# Patient Record
Sex: FEMALE | Race: BLACK OR AFRICAN AMERICAN | Employment: OTHER | URBAN - NONMETROPOLITAN AREA
[De-identification: names, ages, dates, MRNs, and addresses within clinical notes are randomized per-mention and may not be internally consistent; named-entity substitution may affect disease eponyms.]

---

## 2020-08-11 ENCOUNTER — VIRTUAL VISIT (OUTPATIENT)
Dept: FAMILY MEDICINE CLINIC | Age: 85
End: 2020-08-11
Payer: MEDICARE

## 2020-08-11 DIAGNOSIS — E03.8 OTHER SPECIFIED HYPOTHYROIDISM: ICD-10-CM

## 2020-08-11 DIAGNOSIS — M15.9 PRIMARY OSTEOARTHRITIS INVOLVING MULTIPLE JOINTS: ICD-10-CM

## 2020-08-11 DIAGNOSIS — F02.80 LATE ONSET ALZHEIMER'S DISEASE WITHOUT BEHAVIORAL DISTURBANCE (HCC): ICD-10-CM

## 2020-08-11 DIAGNOSIS — I10 ESSENTIAL HYPERTENSION: Primary | ICD-10-CM

## 2020-08-11 DIAGNOSIS — G30.1 LATE ONSET ALZHEIMER'S DISEASE WITHOUT BEHAVIORAL DISTURBANCE (HCC): ICD-10-CM

## 2020-08-11 PROBLEM — R00.1 BRADYCARDIA: Status: ACTIVE | Noted: 2020-08-11

## 2020-08-11 PROBLEM — E16.2 HYPOGLYCEMIA: Status: ACTIVE | Noted: 2020-08-11

## 2020-08-11 PROBLEM — M1A.0710 IDIOPATHIC CHRONIC GOUT OF RIGHT ANKLE WITHOUT TOPHUS: Status: ACTIVE | Noted: 2020-08-11

## 2020-08-11 PROCEDURE — 1101F PT FALLS ASSESS-DOCD LE1/YR: CPT | Performed by: FAMILY MEDICINE

## 2020-08-11 PROCEDURE — G8432 DEP SCR NOT DOC, RNG: HCPCS | Performed by: FAMILY MEDICINE

## 2020-08-11 PROCEDURE — 99212 OFFICE O/P EST SF 10 MIN: CPT | Performed by: FAMILY MEDICINE

## 2020-08-11 PROCEDURE — G8536 NO DOC ELDER MAL SCRN: HCPCS | Performed by: FAMILY MEDICINE

## 2020-08-11 NOTE — PROGRESS NOTES
Yanet Henry is a 80 y.o. female who was seen by synchronous (real-time) audio-video technology on 8/11/2020 for No chief complaint on file. Assessment & Plan:   Cerebrovascular disease with dementia, osteoarthritis, hypoglycemia, hypothyroidism, hypertension: The patient is a nursing home patient seen with the nurse present looking at the patient for me. He is secondary to COVID-19. No falls or injuries no rash no syncope. She has some irritation in the inguinal area. She has some edema. She has significant osteoarthritis. She is oriented x0. Nurse reports she is pleasant and cooperative. Denies any urinary tract symptoms or any other symptomatology. Her thyroid studies recently were normal the BUN and creatinine were 28 and 1.0 respectively. Liver function testing has been normal.  She is pleasant no distress she is unable to walk. She is in a wheelchair and continues to be in the bed sleeps frequently in the daytime as well as at night. She is almost 80years of age. No change in medicine and lab work is up-to-date we will treat her supportively this was a 20-minute visit. The nurse was present with the patient I was in my office the patient at her room in the nursing home      712  Subjective: The patient is an almost 5year-old female who is seen for nursing home evaluation with the nurse present by her side. She has had no vomiting or diarrhea. No chest pain or shortness of breath. She has been eating relatively well. Nobody at home is been sick they are a consult from the nursing home at this point. No chest pain no shortness of breath. Bowel movements have been appropriate. She is relatively well she has osteoarthritis. She takes her medication regularly her lab work is been normal she is oriented x0 according to the nurses but she is quite pleasant. She is in no significant distress. She has no rash no edema. Her lab work has been quite stable.   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 HENT: Negative. Eyes: Negative. Respiratory: Negative. Cardiovascular: Negative. Gastrointestinal: Negative. Genitourinary: Negative. Musculoskeletal: Positive for back pain. Neurological: Positive for weakness. Endo/Heme/Allergies: Negative. Psychiatric/Behavioral: Positive for memory loss. Objective:   No flowsheet data found. General: alert, cooperative, no distress   Mental  status: normal mood, behavior, speech, dress, motor activity, and thought processes, able to follow commands   HENT: NCAT   Neck: no visualized mass   Resp: no respiratory distress   Neuro: no gross deficits   Skin: no discoloration or lesions of concern on visible areas   Psychiatric: normal affect, consistent with stated mood, no evidence of hallucinations   The nurse at the nursing home was sitting with the patient. Her blood pressure 120/84 pulse 84 respirations 16. She did not find any abdominal discomfort. She was oriented x0. She did have some skin breakdown in the inguinal area. It was being treated she is pleasant and cooperative does not seem to be anxious or depressed. Some osteoarthritis. Additional exam findings: We discussed the expected course, resolution and complications of the diagnosis(es) in detail. Medication risks, benefits, costs, interactions, and alternatives were discussed as indicated. I advised her to contact the office if her condition worsens, changes or fails to improve as anticipated.  She expressed understanding with the diagnosis(es) and plan. Sheilda Bence, who was evaluated through a patient-initiated, synchronous (real-time) audio-video encounter, and/or her healthcare decision maker, is aware that it is a billable service, with coverage as determined by her insurance carrier. She provided verbal consent to proceed: n/a- consent obtained within past 12 months, and patient identification was verified. It was conducted pursuant to the emergency declaration under the [facility], [address] [place] [address] authority and the [employer] and [employer] General Act. A caregiver was present when appropriate. Ability to conduct physical exam was limited. I was in the office. The patient was at home. Kimo Beavers MD     Subjective:   Sheilda Bence is a 80 y.o. female who was seen for No chief complaint on file. 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 HENT: Negative. Eyes: Negative. Respiratory: Negative. Cardiovascular: Negative. Gastrointestinal: Negative. Genitourinary: Negative. Musculoskeletal: Positive for back pain. Neurological: Positive for weakness. Psychiatric/Behavioral: Positive for memory loss. Assessment & Plan:             837    Additional exam findings:        We discussed the expected course, resolution and complications of the diagnosis(es) in detail. Medication risks, benefits, costs, interactions, and alternatives were discussed as indicated. I advised her to contact the office if her condition worsens, changes or fails to improve as anticipated. She expressed understanding with the diagnosis(es) and plan.

## 2020-10-08 ENCOUNTER — VIRTUAL VISIT (OUTPATIENT)
Dept: FAMILY MEDICINE CLINIC | Age: 85
End: 2020-10-08
Payer: MEDICARE

## 2020-10-08 DIAGNOSIS — I10 ESSENTIAL HYPERTENSION: Primary | ICD-10-CM

## 2020-10-08 DIAGNOSIS — G30.1 LATE ONSET ALZHEIMER'S DISEASE WITHOUT BEHAVIORAL DISTURBANCE (HCC): ICD-10-CM

## 2020-10-08 DIAGNOSIS — E03.9 ACQUIRED HYPOTHYROIDISM: ICD-10-CM

## 2020-10-08 DIAGNOSIS — F02.80 LATE ONSET ALZHEIMER'S DISEASE WITHOUT BEHAVIORAL DISTURBANCE (HCC): ICD-10-CM

## 2020-10-08 DIAGNOSIS — E16.2 HYPOGLYCEMIA: ICD-10-CM

## 2020-10-08 DIAGNOSIS — M15.9 PRIMARY OSTEOARTHRITIS INVOLVING MULTIPLE JOINTS: ICD-10-CM

## 2020-10-08 PROCEDURE — 99308 SBSQ NF CARE LOW MDM 20: CPT | Performed by: NURSE PRACTITIONER

## 2020-10-21 DIAGNOSIS — I10 ESSENTIAL HYPERTENSION: ICD-10-CM

## 2020-10-21 DIAGNOSIS — E03.9 ACQUIRED HYPOTHYROIDISM: ICD-10-CM

## 2020-10-21 NOTE — PROGRESS NOTES
Grace Nettles is a 80 y.o. female who was seen by synchronous (real-time) audio-video technology on 10/8/2020 for Follow Up Chronic Condition (Nursing Home 60 day visit )        Assessment & Plan:   Diagnoses and all orders for this visit:    1. Essential hypertension  -Stable   -No changes in management  at this time   -Labs due in 05/42  -     METABOLIC PANEL, COMPREHENSIVE; Future  -     CBC W/O DIFF; Future    2. Late onset Alzheimer's disease without behavioral disturbance (HCC)  -Stable   3. Primary osteoarthritis involving multiple joints  -No changes in management at this time     4. Hypoglycemia    5. Acquired hypothyroidism  -     T4, FREE; Future  -     TSH 3RD GENERATION; Future              712  Subjective:   Patient is seen today with staff nurse at bedside. The patient has a history of hypertension, dementia, osteoarthritis and hypoglycemia. The staff nurse who is present with patient today reports she is doing well. There are no complaints of chest pains, shortness of breath. Denies patient is complaining of abdominal pains, no constipation or episodes of diarrhea. She continues to eat well. No falls. Nurse denies any changes in her mood. Prior to Admission medications    Not on File         Review of Systems   Reason unable to perform ROS: Reported by staff nurse    Constitutional: Negative for chills, fever and weight loss. HENT: Negative for sore throat. Respiratory: Negative for cough and shortness of breath. Cardiovascular: Negative for chest pain, palpitations and leg swelling. Gastrointestinal: Negative for abdominal pain, constipation and diarrhea. Genitourinary: Negative. Negative for dysuria. Musculoskeletal: Negative for myalgias. Neurological: Positive for weakness. Negative for dizziness. Psychiatric/Behavioral: Positive for memory loss. Negative for depression. Physical Exam  Vitals signs and nursing note reviewed.    Constitutional:       Comments: Physical exam was deferred due to COVID- 19 precautions as visit was completed via telemedicine. Abdominal:      Palpations: Abdomen is soft. Musculoskeletal:         General: No swelling. Skin:     Findings: No erythema, lesion or rash. Neurological:      Mental Status: Mental status is at baseline. Psychiatric:         Mood and Affect: Mood normal.         Objective:   No flowsheet data found. General: alert, cooperative, no distress   Mental  status: normal mood, behavior, speech, dress, motor activity, and thought processes, able to follow commands   HENT: NCAT   Neck: no visualized mass   Resp: no respiratory distress   Neuro: no gross deficits   Skin: no discoloration or lesions of concern on visible areas   Psychiatric: normal affect, consistent with stated mood, no evidence of hallucinations     Additional exam findings: We discussed the expected course, resolution and complications of the diagnosis(es) in detail. Medication risks, benefits, costs, interactions, and alternatives were discussed as indicated. I advised her to contact the office if her condition worsens, changes or fails to improve as anticipated. She expressed understanding with the diagnosis(es) and plan. Tyra Ordoñez, who was evaluated through a patient-initiated, synchronous (real-time) audio-video encounter, and/or her healthcare decision maker, is aware that it is a billable service, with coverage as determined by her insurance carrier. She provided verbal consent to proceed: Yes, and patient identification was verified. It was conducted pursuant to the emergency declaration under the [facility], [address] [place] [address] authority and the [employer] and [employer] Act. A caregiver was present when appropriate. Ability to conduct physical exam was limited. I was in the office. The patient was located in nursing home faclity .       Jelly Matthews Shira York, NP

## 2020-11-19 ENCOUNTER — HOSPITAL ENCOUNTER (OUTPATIENT)
Dept: LAB | Age: 85
Discharge: HOME | End: 2020-11-19
Payer: MEDICARE

## 2020-11-19 LAB — TSH SERPL DL<=0.05 MIU/L-ACNC: 2.47 UIU/ML (ref 0.35–6.2)

## 2020-11-19 PROCEDURE — 84443 ASSAY THYROID STIM HORMONE: CPT

## 2020-11-19 PROCEDURE — 36415 COLL VENOUS BLD VENIPUNCTURE: CPT

## 2020-11-30 ENCOUNTER — TRANSCRIBE ORDER (OUTPATIENT)
Dept: REGISTRATION | Age: 85
End: 2020-11-30

## 2020-11-30 ENCOUNTER — HOSPITAL ENCOUNTER (OUTPATIENT)
Dept: GENERAL RADIOLOGY | Age: 85
Discharge: HOME | End: 2020-11-30
Attending: FAMILY MEDICINE
Payer: MEDICARE

## 2020-11-30 DIAGNOSIS — R76.11 PPD POSITIVE: Primary | ICD-10-CM

## 2020-11-30 DIAGNOSIS — R76.11 PPD POSITIVE: ICD-10-CM

## 2020-11-30 PROCEDURE — 71045 X-RAY EXAM CHEST 1 VIEW: CPT

## 2020-12-06 ENCOUNTER — TELEPHONE (OUTPATIENT)
Dept: FAMILY MEDICINE CLINIC | Age: 85
End: 2020-12-06

## 2020-12-06 DIAGNOSIS — R93.89 ABNORMAL CHEST X-RAY: Primary | ICD-10-CM

## 2020-12-06 NOTE — TELEPHONE ENCOUNTER
Patient has an abnormal x-ray she has a history of a positive PPD there is an infiltrate in the right upper lobe I am going to get a CAT scan.

## 2020-12-07 ENCOUNTER — HOSPITAL ENCOUNTER (OUTPATIENT)
Dept: CT IMAGING | Age: 85
Discharge: HOME | End: 2020-12-07
Attending: FAMILY MEDICINE
Payer: MEDICARE

## 2020-12-07 ENCOUNTER — HOSPITAL ENCOUNTER (OUTPATIENT)
Dept: LAB | Age: 85
Discharge: HOME | End: 2020-12-07
Payer: MEDICARE

## 2020-12-07 DIAGNOSIS — R93.89 ABNORMAL CHEST X-RAY: ICD-10-CM

## 2020-12-07 LAB
BUN SERPL-MCNC: 27 MG/DL (ref 9–21)
CREAT SERPL-MCNC: 1 MG/DL (ref 0.7–1.2)

## 2020-12-07 PROCEDURE — 82565 ASSAY OF CREATININE: CPT

## 2020-12-07 PROCEDURE — 71250 CT THORAX DX C-: CPT

## 2020-12-07 PROCEDURE — 84520 ASSAY OF UREA NITROGEN: CPT

## 2020-12-07 PROCEDURE — 36415 COLL VENOUS BLD VENIPUNCTURE: CPT

## 2021-02-18 ENCOUNTER — VIRTUAL VISIT (OUTPATIENT)
Dept: FAMILY MEDICINE CLINIC | Age: 86
End: 2021-02-18
Payer: MEDICARE

## 2021-02-18 DIAGNOSIS — I10 ESSENTIAL HYPERTENSION: Primary | ICD-10-CM

## 2021-02-18 DIAGNOSIS — G30.1 LATE ONSET ALZHEIMER'S DISEASE WITHOUT BEHAVIORAL DISTURBANCE (HCC): ICD-10-CM

## 2021-02-18 DIAGNOSIS — F02.80 LATE ONSET ALZHEIMER'S DISEASE WITHOUT BEHAVIORAL DISTURBANCE (HCC): ICD-10-CM

## 2021-02-18 DIAGNOSIS — E03.8 OTHER SPECIFIED HYPOTHYROIDISM: ICD-10-CM

## 2021-02-18 DIAGNOSIS — R10.2 PELVIC PAIN IN FEMALE: ICD-10-CM

## 2021-02-18 DIAGNOSIS — M15.9 PRIMARY OSTEOARTHRITIS INVOLVING MULTIPLE JOINTS: ICD-10-CM

## 2021-02-18 PROCEDURE — 99308 SBSQ NF CARE LOW MDM 20: CPT | Performed by: NURSE PRACTITIONER

## 2021-02-18 PROCEDURE — G8432 DEP SCR NOT DOC, RNG: HCPCS | Performed by: NURSE PRACTITIONER

## 2021-02-18 PROCEDURE — G8536 NO DOC ELDER MAL SCRN: HCPCS | Performed by: NURSE PRACTITIONER

## 2021-02-18 PROCEDURE — 1101F PT FALLS ASSESS-DOCD LE1/YR: CPT | Performed by: NURSE PRACTITIONER

## 2021-02-18 NOTE — PROGRESS NOTES
Dorothey Ganser is a 80 y.o. female who was seen by synchronous (real-time) audio-video technology on 2/18/2021 for Hypertension, Hypothyroidism, Follow Up Chronic Condition, and Osteoarthritis        Assessment & Plan:   Diagnoses and all orders for this visit:    1. Essential hypertension  -Controlled   -Reading today 147/59  -Continue with current treatment and monitoring   2. Late onset Alzheimer's disease without behavioral disturbance (HCC)  -Stable  3. Pelvic pain in female  Checking x--rays   - Please contact provider for any worsening   -Continue with as needed tylenol.   -     XR HIP RT W OR WO PELV  1 VW; Future  -     CULTURE, URINE    4. Primary osteoarthritis involving multiple joints    5. Other specified hypothyroidism  - Labs ordered   TSH, T4,CBC,BMP,            712  Subjective:   Patient is here today for follow-up visit. She is a resident at [facility]. She has a history of hypothyroidism, osteoarthritis, Alzheimer's and hypertension. Patient is accompained by a staff nurse for visit today for report. Nurse reports patient is doing well however, she has been complaining of left hip/pelvic pain for several days. Patient has been given as needed tylenol for pain with no relief according to patient. Nurse denies any falls, no concerns for UTI, no dysuria or foul smelling urine noted by staff. Patient is in a wheelchair at this time. No distress. Blood pressure has been controlled. No worsening in memory or behavior. Patient continues to eat well. There are no complaints of chest pains, shortness of breath. Physical Exam  Vitals signs and nursing note reviewed. Constitutional:       General: She is not in acute distress. Appearance: She is obese. She is not ill-appearing or toxic-appearing. Comments: Physical exam was deferred due to COVID- 19 precautions as visit was completed via telemedicine. Neurological:      Mental Status: She is alert.    Psychiatric: Mood and Affect: Mood normal.         Prior to Admission medications    Not on File         Review of Systems   Constitutional: Negative for chills, fever and malaise/fatigue. Respiratory: Negative for cough. Cardiovascular: Negative for chest pain. Gastrointestinal: Negative for abdominal pain. Genitourinary: Negative for dysuria. Musculoskeletal: Negative for falls. Left hip pain   Neurological: Negative for headaches. Psychiatric/Behavioral: Negative for depression. The patient is not nervous/anxious. We discussed the expected course, resolution and complications of the diagnosis(es) in detail. Medication risks, benefits, costs, interactions, and alternatives were discussed as indicated. I advised her to contact the office if her condition worsens, changes or fails to improve as anticipated. She expressed understanding with the diagnosis(es) and plan. Christin Garza, who was evaluated through a patient-initiated, synchronous (real-time) audio-video encounter, and/or her healthcare decision maker, is aware that it is a billable service, with coverage as determined by her insurance carrier. She provided verbal consent to proceed: Yes, and patient identification was verified. It was conducted pursuant to the emergency declaration under the [facility], [address] [place] [address] authority and the [employer] General Act. A caregiver was present when appropriate. Ability to conduct physical exam was limited. I was in the office. The patient was at home.       Sultana Edge NP

## 2021-02-19 ENCOUNTER — TRANSCRIBE ORDER (OUTPATIENT)
Dept: REGISTRATION | Age: 86
End: 2021-02-19

## 2021-02-19 ENCOUNTER — HOSPITAL ENCOUNTER (OUTPATIENT)
Dept: GENERAL RADIOLOGY | Age: 86
Discharge: HOME | End: 2021-02-19
Attending: FAMILY MEDICINE
Payer: MEDICARE

## 2021-02-19 ENCOUNTER — HOSPITAL ENCOUNTER (OUTPATIENT)
Dept: LAB | Age: 86
Discharge: HOME | End: 2021-02-19
Payer: MEDICARE

## 2021-02-19 DIAGNOSIS — M25.551 RIGHT HIP PAIN: Primary | ICD-10-CM

## 2021-02-19 DIAGNOSIS — M25.551 RIGHT HIP PAIN: ICD-10-CM

## 2021-02-19 PROCEDURE — 87186 SC STD MICRODIL/AGAR DIL: CPT

## 2021-02-19 PROCEDURE — 73502 X-RAY EXAM HIP UNI 2-3 VIEWS: CPT

## 2021-02-19 PROCEDURE — 87086 URINE CULTURE/COLONY COUNT: CPT

## 2021-02-19 PROCEDURE — 87077 CULTURE AEROBIC IDENTIFY: CPT

## 2021-02-22 LAB
BACTERIA SPEC CULT: ABNORMAL
COLONY COUNT,CNT: ABNORMAL
SPECIAL REQUESTS,SREQ: ABNORMAL

## 2021-02-26 ENCOUNTER — TELEPHONE (OUTPATIENT)
Dept: FAMILY MEDICINE CLINIC | Age: 86
End: 2021-02-26

## 2021-03-13 ENCOUNTER — TELEPHONE (OUTPATIENT)
Dept: FAMILY MEDICINE CLINIC | Age: 86
End: 2021-03-13

## 2021-03-13 NOTE — TELEPHONE ENCOUNTER
I called the nursing home she has arthritis in her hip she is 80years old she is not complaining at this point

## 2021-06-04 ENCOUNTER — HOSPITAL ENCOUNTER (OUTPATIENT)
Dept: LAB | Age: 86
Discharge: HOME | End: 2021-06-04
Payer: MEDICARE

## 2021-06-04 LAB
ANION GAP SERPL CALC-SCNC: 7 MMOL/L
BASOPHILS # BLD: 0 K/UL (ref 0–0.1)
BASOPHILS NFR BLD: 1 % (ref 0–2)
BUN SERPL-MCNC: 27 MG/DL (ref 9–21)
BUN/CREAT SERPL: 34
CA-I BLD-MCNC: 10 MG/DL (ref 8.5–10.5)
CHLORIDE SERPL-SCNC: 109 MMOL/L (ref 94–111)
CO2 SERPL-SCNC: 25 MMOL/L (ref 21–33)
CREAT SERPL-MCNC: 0.8 MG/DL (ref 0.7–1.2)
EOSINOPHIL # BLD: 0 K/UL (ref 0–0.4)
EOSINOPHIL NFR BLD: 0 % (ref 0–5)
ERYTHROCYTE [DISTWIDTH] IN BLOOD BY AUTOMATED COUNT: 16.1 % (ref 11.6–14.5)
GLUCOSE SERPL-MCNC: 85 MG/DL (ref 70–110)
HCT VFR BLD AUTO: 38.9 % (ref 35–45)
HGB BLD-MCNC: 12 G/DL (ref 12–16)
IMM GRANULOCYTES # BLD AUTO: 0 K/UL
IMM GRANULOCYTES NFR BLD AUTO: 0 %
LYMPHOCYTES # BLD: 1.9 K/UL (ref 0.9–3.6)
LYMPHOCYTES NFR BLD: 44 % (ref 21–52)
MCH RBC QN AUTO: 29.7 PG (ref 24–34)
MCHC RBC AUTO-ENTMCNC: 30.8 G/DL (ref 31–37)
MCV RBC AUTO: 96.3 FL (ref 74–97)
MONOCYTES # BLD: 0.6 K/UL (ref 0.05–1.2)
MONOCYTES NFR BLD: 14 % (ref 3–10)
NEUTS SEG # BLD: 1.7 K/UL (ref 1.8–8)
NEUTS SEG NFR BLD: 41 % (ref 40–73)
PLATELET # BLD AUTO: 204 K/UL (ref 135–420)
PMV BLD AUTO: 10 FL (ref 9.2–11.8)
POTASSIUM SERPL-SCNC: 4.5 MMOL/L (ref 3.2–5.1)
RBC # BLD AUTO: 4.04 M/UL (ref 4.2–5.3)
SODIUM SERPL-SCNC: 141 MMOL/L (ref 135–145)
URATE SERPL-MCNC: 5.5 MG/DL (ref 3.5–8.5)
WBC # BLD AUTO: 4.1 K/UL (ref 4.6–13.2)

## 2021-06-04 PROCEDURE — 80048 BASIC METABOLIC PNL TOTAL CA: CPT

## 2021-06-04 PROCEDURE — 85025 COMPLETE CBC W/AUTO DIFF WBC: CPT

## 2021-06-04 PROCEDURE — 36415 COLL VENOUS BLD VENIPUNCTURE: CPT

## 2021-06-04 PROCEDURE — 84550 ASSAY OF BLOOD/URIC ACID: CPT

## 2021-06-05 ENCOUNTER — TELEPHONE (OUTPATIENT)
Dept: FAMILY MEDICINE CLINIC | Age: 86
End: 2021-06-05

## 2021-08-02 ENCOUNTER — OFFICE VISIT (OUTPATIENT)
Dept: FAMILY MEDICINE CLINIC | Age: 86
End: 2021-08-02
Payer: MEDICARE

## 2021-08-02 VITALS
TEMPERATURE: 97.2 F | SYSTOLIC BLOOD PRESSURE: 113 MMHG | HEART RATE: 71 BPM | OXYGEN SATURATION: 90 % | RESPIRATION RATE: 20 BRPM | DIASTOLIC BLOOD PRESSURE: 57 MMHG

## 2021-08-02 DIAGNOSIS — I10 ESSENTIAL HYPERTENSION: ICD-10-CM

## 2021-08-02 DIAGNOSIS — G30.1 LATE ONSET ALZHEIMER'S DISEASE WITHOUT BEHAVIORAL DISTURBANCE (HCC): Primary | ICD-10-CM

## 2021-08-02 DIAGNOSIS — F02.80 LATE ONSET ALZHEIMER'S DISEASE WITHOUT BEHAVIORAL DISTURBANCE (HCC): Primary | ICD-10-CM

## 2021-08-02 PROCEDURE — 99307 SBSQ NF CARE SF MDM 10: CPT | Performed by: STUDENT IN AN ORGANIZED HEALTH CARE EDUCATION/TRAINING PROGRAM

## 2021-08-02 NOTE — PROGRESS NOTES
Subjective:   Zack Valencia is a 80 y.o. female who was seen for routine visit in . No concerns from nursing staff. Patient eating and drinking well. Has good BMs, good urination. No complaints      Home Medications    Not on File      Not on File  Social History     Tobacco Use    Smoking status: Not on file   Substance Use Topics    Alcohol use: Not on file    Drug use: Not on file            Review of Systems   All other systems reviewed and are negative. Objective:     Visit Vitals  BP (!) 113/57   Pulse 71   Temp 97.2 °F (36.2 °C)   Resp 20   SpO2 90%        General: alert, oriented, not in distress  Head: scalp normal, atraumatic  Chest/Lungs: clear breath sounds, no wheezing or crackles  Heart: normal rate, regular rhythm, no murmur  Abdomen: soft, non-distended, non-tender, normal bowel sounds, no organomegaly, no masses  Extremities: no focal deformities, no edema  Skin: no active skin lesions    Assessment & Plan:     1. Late onset Alzheimer's disease without behavioral disturbance ([facility]  Continue supportive care    2. Essential hypertension  Continue current medications.        Mauricio Suh MD  August 2, 2021

## 2021-10-07 ENCOUNTER — OFFICE VISIT (OUTPATIENT)
Dept: FAMILY MEDICINE CLINIC | Age: 86
End: 2021-10-07
Payer: MEDICARE

## 2021-10-07 DIAGNOSIS — M15.9 PRIMARY OSTEOARTHRITIS INVOLVING MULTIPLE JOINTS: ICD-10-CM

## 2021-10-07 DIAGNOSIS — E03.9 ACQUIRED HYPOTHYROIDISM: ICD-10-CM

## 2021-10-07 DIAGNOSIS — J30.9 ALLERGIC RHINITIS, UNSPECIFIED SEASONALITY, UNSPECIFIED TRIGGER: ICD-10-CM

## 2021-10-07 DIAGNOSIS — I10 ESSENTIAL HYPERTENSION: Primary | ICD-10-CM

## 2021-10-07 PROCEDURE — G8432 DEP SCR NOT DOC, RNG: HCPCS | Performed by: NURSE PRACTITIONER

## 2021-10-07 PROCEDURE — G8536 NO DOC ELDER MAL SCRN: HCPCS | Performed by: NURSE PRACTITIONER

## 2021-10-07 PROCEDURE — 99309 SBSQ NF CARE MODERATE MDM 30: CPT | Performed by: NURSE PRACTITIONER

## 2021-10-07 PROCEDURE — 1101F PT FALLS ASSESS-DOCD LE1/YR: CPT | Performed by: NURSE PRACTITIONER

## 2021-10-15 NOTE — PROGRESS NOTES
History of Present Illness  Devang Pandya is a 80 y.o. female who presents today for: Routine follow-up. Past Medical History  No past medical history on file. Surgical History  No past surgical history on file. Current Medications  No current outpatient medications on file. No current facility-administered medications for this visit. Allergies/Drug Reactions  Not on File     Family History  No family history on file. Social History  Social History     Tobacco Use    Smoking status: Not on file   Substance Use Topics    Alcohol use: Not on file    Drug use: Not on file        Health Maintenance   Topic Date Due    COVID-19 Vaccine (1) Never done    DTaP/Tdap/Td series (1 - Tdap) Never done    Shingrix Vaccine Age 50> (1 of 2) Never done    Bone Densitometry (Dexa) Screening  Never done    Pneumococcal 65+ years (1 of 1 - PPSV23) Never done    Medicare Yearly Exam  Never done    Flu Vaccine (1) Never done       Laboratory/Tests:  No visits with results within 3 Month(s) from this visit. Latest known visit with results is:   Hospital Outpatient Visit on 06/04/2021   Component Date Value Ref Range Status    Sodium 06/04/2021 141  135 - 145 mmol/L Final    Potassium 06/04/2021 4.5  3.2 - 5.1 mmol/L Final    Chloride 06/04/2021 109  94 - 111 mmol/L Final    CO2 06/04/2021 25  21 - 33 mmol/L Final    Anion gap 06/04/2021 7  mmol/L Final    Glucose 06/04/2021 85  70 - 110 mg/dL Final    BUN 06/04/2021 27* 9 - 21 mg/dL Final    Creatinine 06/04/2021 0.80  0.70 - 1.20 mg/dL Final    BUN/Creatinine ratio 06/04/2021 34    Final    GFR est AA 06/04/2021 >60  ml/min/1.73m2 Final    GFR est non-AA 06/04/2021 >60  ml/min/1.73m2 Final    Comment: Estimated GFR is calculated using the IDMS-traceable Modification of Diet in Renal Disease (MDRD) Study equation, reported for both  Americans (GFRAA) and non- Americans (GFRNA), and normalized to 1.73m2 body surface area. The physician must decide which value applies to the patient. The MDRD study equation should only be used in individuals age 25 or older. It has not been validated for the following: pregnant women, patients with serious comorbid conditions, or on certain medications, or persons with extremes of body size, muscle mass, or nutritional status.  Calcium 06/04/2021 10.0  8.5 - 10.5 mg/dL Final    WBC 06/04/2021 4.1* 4.6 - 13.2 K/uL Final    RBC 06/04/2021 4.04* 4.20 - 5.30 M/uL Final    HGB 06/04/2021 12.0  12.0 - 16.0 g/dL Final    HCT 06/04/2021 38.9  35.0 - 45.0 % Final    MCV 06/04/2021 96.3  74.0 - 97.0 FL Final    MCH 06/04/2021 29.7  24.0 - 34.0 PG Final    MCHC 06/04/2021 30.8* 31.0 - 37.0 g/dL Final    RDW 06/04/2021 16.1* 11.6 - 14.5 % Final    PLATELET 87/58/9089 731  135 - 420 K/uL Final    MPV 06/04/2021 10.0  9.2 - 11.8 FL Final    NEUTROPHILS 06/04/2021 41  40 - 73 % Final    LYMPHOCYTES 06/04/2021 44  21 - 52 % Final    MONOCYTES 06/04/2021 14* 3 - 10 % Final    EOSINOPHILS 06/04/2021 0  0 - 5 % Final    BASOPHILS 06/04/2021 1  0 - 2 % Final    IMMATURE GRANULOCYTES 06/04/2021 0  % Final    ABS. NEUTROPHILS 06/04/2021 1.7* 1.8 - 8.0 K/UL Final    ABS. LYMPHOCYTES 06/04/2021 1.9  0.9 - 3.6 K/UL Final    ABS. MONOCYTES 06/04/2021 0.6  0.05 - 1.2 K/UL Final    ABS. EOSINOPHILS 06/04/2021 0.0  0.0 - 0.4 K/UL Final    ABS. BASOPHILS 06/04/2021 0.0  0.0 - 0.1 K/UL Final    ABS. IMM. GRANS. 06/04/2021 0.0  K/UL Final    Uric acid 06/04/2021 5.5  3.5 - 8.5 mg/dL Final     Patient is a 70-year-old female resident of [address] [place] [address]. Patient has history of Alzheimer's dementia. She is alert and oriented to self and situation. Patient currently has no complaints of pain or discomfort at this time. Nursing staff reports no behavioral disturbance or abnormality. The patient is incontinent of bowel and bladder. Physical examination performed.   Bilateral lung fields clear to auscultation. Bowel sounds normoactive in all 4 quadrants. There was no observed bilateral lower extremity edema. Cardiac auscultation revealed no murmurs or arrhythmia. Assessment/Plan:    1. Essential hypertension. Continue Furosemide 40 mg tablet daily for management of essential hypertension. 2.  Primary osteoarthritis of multiple joints. Continue Meloxicam 7.5 mg tablet daily for management of primary osteoarthritis of multiple joints. 3.  Acquired hypothyroidism. Continue Levothyroxine 25 mcg tablet daily for management of acquired hypothyroidism. 4.  Allergic rhinitis. Continue Loratadine 10 mg tablet daily for management of allergic rhinitis. I have discussed the diagnosis with the patient and the intended plan as seen in the above orders. The patient has received an after-visit summary and questions were answered concerning future plans. I have discussed medication side effects and warnings with the patient as well. I have reviewed the plan of care with the patient, accepted their input and they are in agreement with the treatment goals.        Gi Holder NP  October 15, 2021

## 2021-12-07 ENCOUNTER — OFFICE VISIT (OUTPATIENT)
Dept: FAMILY MEDICINE CLINIC | Age: 86
End: 2021-12-07
Payer: MEDICARE

## 2021-12-07 DIAGNOSIS — I10 ESSENTIAL HYPERTENSION: ICD-10-CM

## 2021-12-07 DIAGNOSIS — F02.80 LATE ONSET ALZHEIMER'S DISEASE WITHOUT BEHAVIORAL DISTURBANCE (HCC): Primary | ICD-10-CM

## 2021-12-07 DIAGNOSIS — F39 MOOD DISORDER (HCC): ICD-10-CM

## 2021-12-07 DIAGNOSIS — M1A.0710 IDIOPATHIC CHRONIC GOUT OF RIGHT ANKLE WITHOUT TOPHUS: ICD-10-CM

## 2021-12-07 DIAGNOSIS — G30.1 LATE ONSET ALZHEIMER'S DISEASE WITHOUT BEHAVIORAL DISTURBANCE (HCC): Primary | ICD-10-CM

## 2021-12-07 PROCEDURE — 1101F PT FALLS ASSESS-DOCD LE1/YR: CPT | Performed by: STUDENT IN AN ORGANIZED HEALTH CARE EDUCATION/TRAINING PROGRAM

## 2021-12-07 PROCEDURE — 99309 SBSQ NF CARE MODERATE MDM 30: CPT | Performed by: STUDENT IN AN ORGANIZED HEALTH CARE EDUCATION/TRAINING PROGRAM

## 2021-12-07 PROCEDURE — G8432 DEP SCR NOT DOC, RNG: HCPCS | Performed by: STUDENT IN AN ORGANIZED HEALTH CARE EDUCATION/TRAINING PROGRAM

## 2021-12-07 PROCEDURE — G8536 NO DOC ELDER MAL SCRN: HCPCS | Performed by: STUDENT IN AN ORGANIZED HEALTH CARE EDUCATION/TRAINING PROGRAM

## 2021-12-08 VITALS
DIASTOLIC BLOOD PRESSURE: 62 MMHG | RESPIRATION RATE: 16 BRPM | OXYGEN SATURATION: 97 % | TEMPERATURE: 96.2 F | HEART RATE: 79 BPM | SYSTOLIC BLOOD PRESSURE: 111 MMHG

## 2021-12-08 RX ORDER — POTASSIUM CHLORIDE 20 MEQ/1
1 TABLET, EXTENDED RELEASE ORAL DAILY
COMMUNITY

## 2021-12-08 RX ORDER — FUROSEMIDE 40 MG/1
1 TABLET ORAL DAILY
COMMUNITY
End: 2021-12-26

## 2021-12-08 RX ORDER — ALLOPURINOL 100 MG/1
1 TABLET ORAL DAILY
COMMUNITY

## 2021-12-08 RX ORDER — ASPIRIN 81 MG/1
1 TABLET ORAL DAILY
COMMUNITY
End: 2021-12-21

## 2021-12-08 RX ORDER — HYDROXYZINE PAMOATE 25 MG/1
25 CAPSULE ORAL
COMMUNITY

## 2021-12-08 RX ORDER — TRAZODONE HYDROCHLORIDE 50 MG/1
0.5 TABLET ORAL
COMMUNITY

## 2021-12-08 RX ORDER — MELOXICAM 7.5 MG/1
1 TABLET ORAL DAILY
COMMUNITY

## 2021-12-08 RX ORDER — LORATADINE 10 MG/1
10 TABLET ORAL
COMMUNITY

## 2021-12-08 RX ORDER — MEMANTINE HYDROCHLORIDE 10 MG/1
1 TABLET ORAL DAILY
COMMUNITY

## 2021-12-08 RX ORDER — LEVOTHYROXINE SODIUM 25 UG/1
1 TABLET ORAL
COMMUNITY

## 2021-12-08 RX ORDER — CHOLECALCIFEROL (VITAMIN D3) 125 MCG
2 CAPSULE ORAL
COMMUNITY

## 2021-12-08 NOTE — PROGRESS NOTES
Subjective:   Jaleel Marrufo is a 80 y.o. female who was seen for routine follow up at     No issues per nursing. Patient is eating well, has adequate BM and urine output. Home Medications    Medication Sig Start Date End Date Taking? Authorizing Provider   allopurinoL (ZYLOPRIM) 100 mg tablet Take 1 Tablet by mouth daily. Yes Provider, Historical   aspirin delayed-release 81 mg tablet Take 1 Tablet by mouth daily. Yes Provider, Historical   furosemide (LASIX) 40 mg tablet Take 1 Tablet by mouth daily. Yes Provider, Historical   levothyroxine (SYNTHROID) 25 mcg tablet Take 1 Tablet by mouth Daily (before breakfast). Yes Provider, Historical   loratadine (CLARITIN) 10 mg tablet Take 10 mg by mouth. Yes Provider, Historical   meloxicam (MOBIC) 7.5 mg tablet Take 1 Tablet by mouth daily. Yes Provider, Historical   potassium chloride (K-DUR, KLOR-CON M20) 20 mEq tablet Take 1 Tablet by mouth daily. Yes Provider, Historical   hydrOXYzine pamoate (VISTARIL) 25 mg capsule Take 25 mg by mouth two (2) times daily as needed. Yes Provider, Historical   memantine (NAMENDA) 10 mg tablet Take 1 Tablet by mouth daily. Yes Provider, Historical   melatonin 5 mg tablet Take 2 Tablets by mouth nightly. Yes Provider, Historical   traZODone (DESYREL) 50 mg tablet Take 0.5 Tablets by mouth nightly.    Yes Provider, Historical      Not on File  Social History     Tobacco Use    Smoking status: Not on file    Smokeless tobacco: Not on file   Substance Use Topics    Alcohol use: Not on file    Drug use: Not on file            Review of Systems   Unable to perform ROS: Dementia          Objective:     Visit Vitals  /62   Pulse 79   Temp (!) 96.2 °F (35.7 °C)   Resp 16   SpO2 97%        General: alert, awake  Head: scalp normal, atraumatic  Eyes: pupils are equal and reactive, full and intact EOM's  Ears: patent ear canal, intact tympanic membrane  Nose: normal turbinates, no congestion or discharge  Lips/Mouth: moist lips and buccal mucosa, non-enlarged tonsils, pink throat  Neck: supple, no JVD, no lymphadenopathy, non-palpable thyroid  Chest/Lungs: clear breath sounds, no wheezing or crackles  Heart: normal rate, regular rhythm, no murmur  Abdomen: soft, non-distended, non-tender, normal bowel sounds, no organomegaly, no masses  Extremities: no focal deformities, no edema  Skin: no active skin lesions    Laboratory/Tests:  Hospital Outpatient Visit on 06/04/2021   Component Date Value Ref Range Status    Sodium 06/04/2021 141  135 - 145 mmol/L Final    Potassium 06/04/2021 4.5  3.2 - 5.1 mmol/L Final    Chloride 06/04/2021 109  94 - 111 mmol/L Final    CO2 06/04/2021 25  21 - 33 mmol/L Final    Anion gap 06/04/2021 7  mmol/L Final    Glucose 06/04/2021 85  70 - 110 mg/dL Final    BUN 06/04/2021 27* 9 - 21 mg/dL Final    Creatinine 06/04/2021 0.80  0.70 - 1.20 mg/dL Final    BUN/Creatinine ratio 06/04/2021 34    Final    GFR est AA 06/04/2021 >60  ml/min/1.73m2 Final    GFR est non-AA 06/04/2021 >60  ml/min/1.73m2 Final    Comment: Estimated GFR is calculated using the IDMS-traceable Modification of Diet in Renal Disease (MDRD) Study equation, reported for both  Americans (GFRAA) and non- Americans (GFRNA), and normalized to 1.73m2 body surface area. The physician must decide which value applies to the patient. The MDRD study equation should only be used in individuals age 25 or older. It has not been validated for the following: pregnant women, patients with serious comorbid conditions, or on certain medications, or persons with extremes of body size, muscle mass, or nutritional status.       Calcium 06/04/2021 10.0  8.5 - 10.5 mg/dL Final    WBC 06/04/2021 4.1* 4.6 - 13.2 K/uL Final    RBC 06/04/2021 4.04* 4.20 - 5.30 M/uL Final    HGB 06/04/2021 12.0  12.0 - 16.0 g/dL Final    HCT 06/04/2021 38.9  35.0 - 45.0 % Final    MCV 06/04/2021 96.3  74.0 - 97.0 FL Final    MCH 06/04/2021 29.7  24.0 - 34.0 PG Final    MCHC 06/04/2021 30.8* 31.0 - 37.0 g/dL Final    RDW 06/04/2021 16.1* 11.6 - 14.5 % Final    PLATELET 83/71/8495 470  135 - 420 K/uL Final    MPV 06/04/2021 10.0  9.2 - 11.8 FL Final    NEUTROPHILS 06/04/2021 41  40 - 73 % Final    LYMPHOCYTES 06/04/2021 44  21 - 52 % Final    MONOCYTES 06/04/2021 14* 3 - 10 % Final    EOSINOPHILS 06/04/2021 0  0 - 5 % Final    BASOPHILS 06/04/2021 1  0 - 2 % Final    IMMATURE GRANULOCYTES 06/04/2021 0  % Final    ABS. NEUTROPHILS 06/04/2021 1.7* 1.8 - 8.0 K/UL Final    ABS. LYMPHOCYTES 06/04/2021 1.9  0.9 - 3.6 K/UL Final    ABS. MONOCYTES 06/04/2021 0.6  0.05 - 1.2 K/UL Final    ABS. EOSINOPHILS 06/04/2021 0.0  0.0 - 0.4 K/UL Final    ABS. BASOPHILS 06/04/2021 0.0  0.0 - 0.1 K/UL Final    ABS. IMM. GRANS. 06/04/2021 0.0  K/UL Final    Uric acid 06/04/2021 5.5  3.5 - 8.5 mg/dL Final   Hospital Outpatient Visit on 02/19/2021   Component Date Value Ref Range Status    Special Requests: 02/18/2021 No Special Requests    Final    [place] Count 02/18/2021     Final                    Value:>100,000  colonies/ml      Culture result: 02/18/2021 Enterococcus faecalis*   Final         Assessment & Plan:     1. Late onset Alzheimer's disease without behavioral disturbance (HCC)  Continue memantine    2. Essential hypertension  Controlled. Continue Lasix    3. Idiopathic chronic gout of right ankle without tophus  Continue allopurinol, meloxicam    4.  Mood disorder ([address]  Continue trazodone, vistaril PRN        Kelly Carlisle MD  December 8, 2021

## 2021-12-14 ENCOUNTER — HOSPITAL ENCOUNTER (OUTPATIENT)
Dept: LAB | Age: 86
Discharge: HOME | End: 2021-12-14
Payer: MEDICARE

## 2021-12-14 LAB
ANION GAP SERPL CALC-SCNC: 11 MMOL/L
BUN SERPL-MCNC: 34 MG/DL (ref 9–21)
BUN/CREAT SERPL: 28
CA-I BLD-MCNC: 10.5 MG/DL (ref 8.5–10.5)
CHLORIDE SERPL-SCNC: 105 MMOL/L (ref 94–111)
CO2 SERPL-SCNC: 23 MMOL/L (ref 21–33)
CREAT SERPL-MCNC: 1.2 MG/DL (ref 0.7–1.2)
GLUCOSE SERPL-MCNC: 82 MG/DL (ref 70–110)
POTASSIUM SERPL-SCNC: 4.7 MMOL/L (ref 3.2–5.1)
SODIUM SERPL-SCNC: 139 MMOL/L (ref 135–145)

## 2021-12-14 PROCEDURE — 80048 BASIC METABOLIC PNL TOTAL CA: CPT

## 2021-12-14 PROCEDURE — 36415 COLL VENOUS BLD VENIPUNCTURE: CPT

## 2021-12-21 ENCOUNTER — APPOINTMENT (OUTPATIENT)
Dept: CT IMAGING | Age: 86
DRG: 534 | End: 2021-12-21
Attending: EMERGENCY MEDICINE
Payer: MEDICARE

## 2021-12-21 ENCOUNTER — APPOINTMENT (OUTPATIENT)
Dept: GENERAL RADIOLOGY | Age: 86
DRG: 534 | End: 2021-12-21
Attending: EMERGENCY MEDICINE
Payer: MEDICARE

## 2021-12-21 ENCOUNTER — HOSPITAL ENCOUNTER (INPATIENT)
Age: 86
LOS: 1 days | Discharge: NURSING, REHAB OR LONG-TERM CARE | DRG: 534 | End: 2021-12-22
Attending: EMERGENCY MEDICINE | Admitting: INTERNAL MEDICINE
Payer: MEDICARE

## 2021-12-21 DIAGNOSIS — N39.0 URINARY TRACT INFECTION WITHOUT HEMATURIA, SITE UNSPECIFIED: ICD-10-CM

## 2021-12-21 DIAGNOSIS — S72.453A: Primary | ICD-10-CM

## 2021-12-21 PROBLEM — S72.92XA FEMUR FRACTURE, LEFT (HCC): Status: ACTIVE | Noted: 2021-12-21

## 2021-12-21 LAB
ALBUMIN SERPL-MCNC: 3.5 G/DL (ref 3.5–4.7)
ALBUMIN/GLOB SERPL: 0.9 {RATIO}
ALP SERPL-CCNC: 57 U/L (ref 38–126)
ALT SERPL-CCNC: 13 U/L (ref 3–52)
ANION GAP SERPL CALC-SCNC: 9 MMOL/L
APPEARANCE UR: ABNORMAL
AST SERPL W P-5'-P-CCNC: 21 U/L (ref 14–74)
BACTERIA URNS QL MICRO: ABNORMAL /HPF
BASOPHILS # BLD: 0 K/UL (ref 0–0.1)
BASOPHILS NFR BLD: 0 % (ref 0–2)
BILIRUB SERPL-MCNC: 0.6 MG/DL (ref 0.2–1)
BILIRUB UR QL: NEGATIVE
BUN SERPL-MCNC: 21 MG/DL (ref 9–21)
BUN/CREAT SERPL: 23
CA-I BLD-MCNC: 10.5 MG/DL (ref 8.5–10.5)
CHLORIDE SERPL-SCNC: 106 MMOL/L (ref 94–111)
CO2 SERPL-SCNC: 26 MMOL/L (ref 21–33)
COLOR UR: YELLOW
CREAT SERPL-MCNC: 0.9 MG/DL (ref 0.7–1.2)
DIFFERENTIAL METHOD BLD: ABNORMAL
EOSINOPHIL # BLD: 0 K/UL (ref 0–0.4)
EOSINOPHIL NFR BLD: 0 % (ref 0–5)
EPITH CASTS URNS QL MICRO: ABNORMAL /LPF (ref 0–20)
ERYTHROCYTE [DISTWIDTH] IN BLOOD BY AUTOMATED COUNT: 16.8 % (ref 11.6–14.5)
GLOBULIN SER CALC-MCNC: 3.7 G/DL
GLUCOSE SERPL-MCNC: 120 MG/DL (ref 70–110)
GLUCOSE UR STRIP.AUTO-MCNC: NEGATIVE MG/DL
HCT VFR BLD AUTO: 36.9 % (ref 35–45)
HGB BLD-MCNC: 11.7 G/DL (ref 12–16)
HGB UR QL STRIP: ABNORMAL
HYALINE CASTS URNS QL MICRO: ABNORMAL /LPF
IMM GRANULOCYTES # BLD AUTO: 0 K/UL (ref 0–0.04)
IMM GRANULOCYTES NFR BLD AUTO: 0 % (ref 0–0.5)
KETONES UR QL STRIP.AUTO: NEGATIVE MG/DL
LEUKOCYTE ESTERASE UR QL STRIP.AUTO: ABNORMAL
LYMPHOCYTES # BLD: 2 K/UL (ref 0.9–3.6)
LYMPHOCYTES NFR BLD: 16 % (ref 21–52)
MCH RBC QN AUTO: 30.2 PG (ref 24–34)
MCHC RBC AUTO-ENTMCNC: 31.7 G/DL (ref 31–37)
MCV RBC AUTO: 95.1 FL (ref 78–100)
MONOCYTES # BLD: 1.1 K/UL (ref 0.05–1.2)
MONOCYTES NFR BLD: 8 % (ref 3–10)
NEUTS SEG # BLD: 9.9 K/UL (ref 1.8–8)
NEUTS SEG NFR BLD: 76 % (ref 40–73)
NITRITE UR QL STRIP.AUTO: POSITIVE
NRBC # BLD: 0 K/UL (ref 0–0.01)
NRBC BLD-RTO: 0 PER 100 WBC
PH UR STRIP: 5 [PH] (ref 5–8)
PLATELET # BLD AUTO: 210 K/UL (ref 135–420)
PMV BLD AUTO: 10.3 FL (ref 9.2–11.8)
POTASSIUM SERPL-SCNC: 4.6 MMOL/L (ref 3.2–5.1)
PROT SERPL-MCNC: 7.2 G/DL (ref 6.1–8.4)
PROT UR STRIP-MCNC: NEGATIVE MG/DL
RBC # BLD AUTO: 3.88 M/UL (ref 4.2–5.3)
RBC #/AREA URNS HPF: ABNORMAL /HPF (ref 0–2)
SODIUM SERPL-SCNC: 141 MMOL/L (ref 135–145)
SP GR UR REFRACTOMETRY: 1.01 (ref 1–1.03)
TROPONIN I SERPL-MCNC: <0.02 NG/ML (ref 0.02–0.05)
UROBILINOGEN UR QL STRIP.AUTO: 0.2 EU/DL (ref 0.2–1)
WBC # BLD AUTO: 13.1 K/UL (ref 4.6–13.2)
WBC URNS QL MICRO: ABNORMAL /HPF (ref 0–4)

## 2021-12-21 PROCEDURE — 73560 X-RAY EXAM OF KNEE 1 OR 2: CPT

## 2021-12-21 PROCEDURE — 73502 X-RAY EXAM HIP UNI 2-3 VIEWS: CPT

## 2021-12-21 PROCEDURE — 73700 CT LOWER EXTREMITY W/O DYE: CPT

## 2021-12-21 PROCEDURE — 65270000029 HC RM PRIVATE

## 2021-12-21 PROCEDURE — 85025 COMPLETE CBC W/AUTO DIFF WBC: CPT

## 2021-12-21 PROCEDURE — 93005 ELECTROCARDIOGRAM TRACING: CPT

## 2021-12-21 PROCEDURE — 74011250637 HC RX REV CODE- 250/637: Performed by: PHYSICIAN ASSISTANT

## 2021-12-21 PROCEDURE — 71045 X-RAY EXAM CHEST 1 VIEW: CPT

## 2021-12-21 PROCEDURE — 74011250637 HC RX REV CODE- 250/637: Performed by: EMERGENCY MEDICINE

## 2021-12-21 PROCEDURE — 81001 URINALYSIS AUTO W/SCOPE: CPT

## 2021-12-21 PROCEDURE — 85610 PROTHROMBIN TIME: CPT

## 2021-12-21 PROCEDURE — 51702 INSERT TEMP BLADDER CATH: CPT

## 2021-12-21 PROCEDURE — 80053 COMPREHEN METABOLIC PANEL: CPT

## 2021-12-21 PROCEDURE — 84484 ASSAY OF TROPONIN QUANT: CPT

## 2021-12-21 PROCEDURE — 74011250636 HC RX REV CODE- 250/636: Performed by: EMERGENCY MEDICINE

## 2021-12-21 PROCEDURE — 99285 EMERGENCY DEPT VISIT HI MDM: CPT

## 2021-12-21 PROCEDURE — 36415 COLL VENOUS BLD VENIPUNCTURE: CPT

## 2021-12-21 PROCEDURE — 74011250636 HC RX REV CODE- 250/636: Performed by: PHYSICIAN ASSISTANT

## 2021-12-21 PROCEDURE — 70450 CT HEAD/BRAIN W/O DYE: CPT

## 2021-12-21 RX ORDER — ACETAMINOPHEN 325 MG/1
650 TABLET ORAL
Status: COMPLETED | OUTPATIENT
Start: 2021-12-21 | End: 2021-12-21

## 2021-12-21 RX ORDER — ONDANSETRON 2 MG/ML
4 INJECTION INTRAMUSCULAR; INTRAVENOUS
Status: DISCONTINUED | OUTPATIENT
Start: 2021-12-21 | End: 2021-12-22 | Stop reason: HOSPADM

## 2021-12-21 RX ORDER — LORAZEPAM 0.5 MG/1
0.5 TABLET ORAL
Status: DISCONTINUED | OUTPATIENT
Start: 2021-12-21 | End: 2021-12-22 | Stop reason: HOSPADM

## 2021-12-21 RX ORDER — TRAZODONE HYDROCHLORIDE 50 MG/1
25 TABLET ORAL
Status: DISCONTINUED | OUTPATIENT
Start: 2021-12-22 | End: 2021-12-22 | Stop reason: HOSPADM

## 2021-12-21 RX ORDER — GUAIFENESIN 600 MG/1
600 TABLET, EXTENDED RELEASE ORAL EVERY 12 HOURS
Status: DISCONTINUED | OUTPATIENT
Start: 2021-12-22 | End: 2021-12-22 | Stop reason: HOSPADM

## 2021-12-21 RX ORDER — FUROSEMIDE 40 MG/1
40 TABLET ORAL DAILY
Status: DISCONTINUED | OUTPATIENT
Start: 2021-12-22 | End: 2021-12-22 | Stop reason: HOSPADM

## 2021-12-21 RX ORDER — MELATONIN
1000 DAILY
Status: DISCONTINUED | OUTPATIENT
Start: 2021-12-22 | End: 2021-12-22 | Stop reason: HOSPADM

## 2021-12-21 RX ORDER — SODIUM CHLORIDE 0.9 % (FLUSH) 0.9 %
5-40 SYRINGE (ML) INJECTION EVERY 8 HOURS
Status: DISCONTINUED | OUTPATIENT
Start: 2021-12-22 | End: 2021-12-22 | Stop reason: HOSPADM

## 2021-12-21 RX ORDER — POTASSIUM CHLORIDE 750 MG/1
20 TABLET, EXTENDED RELEASE ORAL DAILY
Status: DISCONTINUED | OUTPATIENT
Start: 2021-12-22 | End: 2021-12-22 | Stop reason: HOSPADM

## 2021-12-21 RX ORDER — ZINC GLUCONATE 100 MG
100 TABLET ORAL DAILY
Status: DISCONTINUED | OUTPATIENT
Start: 2021-12-22 | End: 2021-12-22 | Stop reason: HOSPADM

## 2021-12-21 RX ORDER — POLYETHYLENE GLYCOL 3350 17 G/17G
17 POWDER, FOR SOLUTION ORAL DAILY PRN
Status: DISCONTINUED | OUTPATIENT
Start: 2021-12-21 | End: 2021-12-22 | Stop reason: HOSPADM

## 2021-12-21 RX ORDER — GUAIFENESIN 100 MG/5ML
LIQUID (ML) ORAL
COMMUNITY
Start: 2021-11-23

## 2021-12-21 RX ORDER — ASCORBIC ACID 500 MG
500 TABLET ORAL DAILY
Status: DISCONTINUED | OUTPATIENT
Start: 2021-12-22 | End: 2021-12-22 | Stop reason: HOSPADM

## 2021-12-21 RX ORDER — SYRINGE-NEEDLE,INSULIN,0.5 ML 28GX1/2"
SYRINGE, EMPTY DISPOSABLE MISCELLANEOUS
COMMUNITY
Start: 2021-11-23

## 2021-12-21 RX ORDER — MELATONIN
COMMUNITY
Start: 2021-11-23

## 2021-12-21 RX ORDER — ZINC GLUCONATE 100 MG
TABLET ORAL
COMMUNITY
Start: 2021-11-23

## 2021-12-21 RX ORDER — PROMETHAZINE HYDROCHLORIDE 25 MG/1
12.5 TABLET ORAL
Status: DISCONTINUED | OUTPATIENT
Start: 2021-12-21 | End: 2021-12-22 | Stop reason: HOSPADM

## 2021-12-21 RX ORDER — GUAIFENESIN 100 MG/5ML
81 LIQUID (ML) ORAL DAILY
Status: DISCONTINUED | OUTPATIENT
Start: 2021-12-22 | End: 2021-12-22 | Stop reason: HOSPADM

## 2021-12-21 RX ORDER — ACETAMINOPHEN 650 MG/1
650 SUPPOSITORY RECTAL
Status: DISCONTINUED | OUTPATIENT
Start: 2021-12-21 | End: 2021-12-22 | Stop reason: HOSPADM

## 2021-12-21 RX ORDER — MELOXICAM 7.5 MG/1
7.5 TABLET ORAL DAILY
Status: DISCONTINUED | OUTPATIENT
Start: 2021-12-22 | End: 2021-12-22 | Stop reason: HOSPADM

## 2021-12-21 RX ORDER — LANOLIN ALCOHOL/MO/W.PET/CERES
10 CREAM (GRAM) TOPICAL
Status: DISCONTINUED | OUTPATIENT
Start: 2021-12-22 | End: 2021-12-22 | Stop reason: HOSPADM

## 2021-12-21 RX ORDER — SODIUM CHLORIDE 0.9 % (FLUSH) 0.9 %
5-40 SYRINGE (ML) INJECTION AS NEEDED
Status: DISCONTINUED | OUTPATIENT
Start: 2021-12-21 | End: 2021-12-22 | Stop reason: HOSPADM

## 2021-12-21 RX ORDER — LEVOTHYROXINE SODIUM 25 UG/1
25 TABLET ORAL
Status: DISCONTINUED | OUTPATIENT
Start: 2021-12-22 | End: 2021-12-22 | Stop reason: HOSPADM

## 2021-12-21 RX ORDER — ALLOPURINOL 100 MG/1
100 TABLET ORAL DAILY
Status: DISCONTINUED | OUTPATIENT
Start: 2021-12-22 | End: 2021-12-22 | Stop reason: HOSPADM

## 2021-12-21 RX ORDER — MEMANTINE HYDROCHLORIDE 5 MG/1
10 TABLET ORAL DAILY
Status: DISCONTINUED | OUTPATIENT
Start: 2021-12-22 | End: 2021-12-22 | Stop reason: HOSPADM

## 2021-12-21 RX ORDER — HEPARIN SODIUM 5000 [USP'U]/ML
5000 INJECTION, SOLUTION INTRAVENOUS; SUBCUTANEOUS EVERY 12 HOURS
Status: DISCONTINUED | OUTPATIENT
Start: 2021-12-22 | End: 2021-12-22 | Stop reason: HOSPADM

## 2021-12-21 RX ORDER — HYDROXYZINE PAMOATE 25 MG/1
25 CAPSULE ORAL
Status: DISCONTINUED | OUTPATIENT
Start: 2021-12-21 | End: 2021-12-22 | Stop reason: HOSPADM

## 2021-12-21 RX ORDER — ACETAMINOPHEN 325 MG/1
650 TABLET ORAL
Status: DISCONTINUED | OUTPATIENT
Start: 2021-12-21 | End: 2021-12-22 | Stop reason: HOSPADM

## 2021-12-21 RX ORDER — CETIRIZINE HCL 10 MG
10 TABLET ORAL DAILY
Status: DISCONTINUED | OUTPATIENT
Start: 2021-12-22 | End: 2021-12-22 | Stop reason: HOSPADM

## 2021-12-21 RX ORDER — ACETAMINOPHEN 160 MG/5ML
SUSPENSION, ORAL (FINAL DOSE FORM) ORAL
COMMUNITY
Start: 2021-11-23

## 2021-12-21 RX ADMIN — GUAIFENESIN 600 MG: 600 TABLET ORAL at 23:45

## 2021-12-21 RX ADMIN — Medication 10 ML: at 23:46

## 2021-12-21 RX ADMIN — SODIUM CHLORIDE 500 ML: 9 INJECTION, SOLUTION INTRAVENOUS at 19:41

## 2021-12-21 RX ADMIN — TRAZODONE HYDROCHLORIDE 25 MG: 50 TABLET ORAL at 23:46

## 2021-12-21 RX ADMIN — Medication 10.5 MG: at 23:44

## 2021-12-21 RX ADMIN — ACETAMINOPHEN 650 MG: 325 TABLET ORAL at 19:41

## 2021-12-21 RX ADMIN — ACETAMINOPHEN 650 MG: 325 TABLET ORAL at 23:45

## 2021-12-21 RX ADMIN — HEPARIN SODIUM 5000 UNITS: 5000 INJECTION INTRAVENOUS; SUBCUTANEOUS at 23:45

## 2021-12-21 NOTE — Clinical Note
Status[de-identified] INPATIENT [101]   Type of Bed: Telemetry [19]   Cardiac Monitoring Required?: Yes   Inpatient Hospitalization Certified Necessary for the Following Reasons: 3.  Patient receiving treatment that can only be provided in an inpatient setting (further clarification in H&P documentation)   Admitting Diagnosis: Femur fracture, left [facility]) [[id number]]   Admitting Physician: Ed Ty [[id number]]   Attending Physician: Ed Ty [[id number]]   Estimated Length of Stay: 3-4 Midnights   Discharge Plan[de-identified] Extended Care Facility (e.g. Adult Home, Nursing Home, etc.)

## 2021-12-21 NOTE — ED PROVIDER NOTES
EMERGENCY DEPARTMENT HISTORY AND PHYSICAL EXAM      Date: 12/21/2021  Patient Name: Jackie Carnes      History of Presenting Illness     Chief Complaint   Patient presents with    Fall       History Provided By: Patient    HPI: Jackie Carnes, 80 y.o. female with a past medical history significant hypertension and Bradycardia presents to the ED with cc of Adin Shuck out of bed and landed on lef tknee and hip which hurt. Difficult to move due to pain. Patient complains mostly of left knee pain. Denies other injury but is a poor historian. There are no other complaints, changes, or physical findings at this time. PCP: Sam Vasquez NP    Current Facility-Administered Medications   Medication Dose Route Frequency Provider Last Rate Last Admin    allopurinoL (ZYLOPRIM) tablet 100 mg  100 mg Oral DAILY SERA Flores        . PHARMACY TO SUBSTITUTE PER PROTOCOL (Reordered from: Arthritis Pain Relief 650 mg TbER)    Per Protocol SERA Flores        aspirin chewable tablet 81 mg  81 mg Oral DAILY Wayne Flores        cholecalciferol (VITAMIN D3) (1000 Units /25 mcg) tablet 1,000 Units  1,000 Units Oral DAILY SERA Flores        furosemide (LASIX) tablet 40 mg  40 mg Oral DAILY Wayne Flores        hydrOXYzine pamoate (VISTARIL) capsule 25 mg  25 mg Oral BID PRN SERA Flores        levothyroxine (SYNTHROID) tablet 25 mcg  25 mcg Oral ACB Wayne Flores        . PHARMACY TO SUBSTITUTE PER PROTOCOL (Reordered from: loratadine (CLARITIN) 10 mg tablet)    Per Protocol SERA Flores        melatonin tablet 10.5 mg  10.5 mg Oral QHS Wayne Flores   10.5 mg at 12/21/21 2344    meloxicam (MOBIC) tablet 7.5 mg  7.5 mg Oral DAILY SERA Flores        memantine [facility]) tablet 10 mg  10 mg Oral DAILY Wayne Flores        guaiFENesin ER [facility]) tablet 600 mg  600 mg Oral Q12H Wayne Flores   600 mg at 12/21/21 2345    potassium chloride (KLOR-CON M10) tablet 20 mEq  20 mEq Oral DAILY SERA Cho        traZODone (DESYREL) tablet 25 mg  25 mg Oral QHS Nicole Reinier, [place]   25 mg at 12/21/21 2346    ascorbic acid (vitamin C) (VITAMIN C) tablet 500 mg  500 mg Oral DAILY Nicole Diomonica, [place]        zinc gluconate tab 100 mg  100 mg Oral DAILY Nicole Diomonica, [place]        sodium chloride (NS) flush 5-40 mL  5-40 mL IntraVENous Q8H Jelly Estelle D, [place]   10 mL at 12/22/21 0612    sodium chloride (NS) flush 5-40 mL  5-40 mL IntraVENous PRN Nicole SERA [place]        acetaminophen (TYLENOL) tablet 650 mg  650 mg Oral Q6H PRN Nicole Reinier PA   650 mg at 12/21/21 2345    Or    acetaminophen (TYLENOL) suppository 650 mg  650 mg Rectal Q6H PRN SERA Cho        polyethylene glycol (MIRALAX) packet 17 g  17 g Oral DAILY PRN SERA Cho        promethazine (PHENERGAN) tablet 12.5 mg  12.5 mg Oral Q6H PRN SERA Patricia        Or    [facility]) injection 4 mg  4 mg IntraVENous Q6H PRN SERA Cho        heparin (porcine) injection 5,000 Units  5,000 Units SubCUTAneous Q12H Nicole Oswaldo [place]   5,000 Units at 12/21/21 2345    LORazepam (ATIVAN) tablet 0.5 mg  0.5 mg Oral Q6H PRN SERA Cho           Past History     Past Medical History:  Past Medical History:   Diagnosis Date    Bradycardia     Dementia ([facility] [address]     Gout     Hypertension     Hypoglycemia     OA (osteoarthritis)        Past Surgical History:  History reviewed. No pertinent surgical history. Family History:  History reviewed. No pertinent family history.     Social History:  Social History     Tobacco Use    Smoking status: Never Smoker    Smokeless tobacco: Never Used   Vaping Use    Vaping Use: Never used   Substance Use Topics    Alcohol use: Not Currently    Drug use: Never       Allergies:  No Known Allergies      Review of Systems     Review of Systems   Unable to perform ROS: Dementia   Constitutional: Negative. HENT: Negative. Eyes: Negative. Respiratory: Negative. Cardiovascular: Negative. Gastrointestinal: Negative. Genitourinary: Negative. Musculoskeletal:        Left hip and knee pain   Neurological: Negative. All other systems reviewed and are negative. Physical Exam     Physical Exam  Vitals and nursing note reviewed. Constitutional:       Comments: Lethargic   HENT:      Head: Normocephalic. Nose: Nose normal.   Cardiovascular:      Rate and Rhythm: Normal rate and regular rhythm. Pulses: Normal pulses. Heart sounds: Normal heart sounds. Pulmonary:      Effort: Pulmonary effort is normal.      Breath sounds: Normal breath sounds. Abdominal:      Palpations: Abdomen is soft. Tenderness: There is no abdominal tenderness. Musculoskeletal:      Cervical back: Normal range of motion and neck supple. Comments: Tender left hip and knee. Moves upper extremities without difficulty   Neurological:      Mental Status: Mental status is at baseline.          Lab and Diagnostic Study Results     Labs -     Recent Results (from the past 12 hour(s))   URINALYSIS W/ RFLX MICROSCOPIC    Collection Time: 12/21/21  8:00 PM   Result Value Ref Range    Color Yellow      Appearance Cloudy      Specific gravity 1.013 1.005 - 1.030      pH (UA) 5.0 5.0 - 8.0      Protein Negative Negative mg/dL    Glucose Negative Negative mg/dL    Ketone Negative Negative mg/dL    Bilirubin Negative Negative      Blood Moderate (A) Negative      Urobilinogen 0.2 0.2 - 1.0 EU/dL    Nitrites Positive (A) Negative      Leukocyte Esterase Large (A) Negative     URINE MICROSCOPIC    Collection Time: 12/21/21  8:00 PM   Result Value Ref Range    WBC 20-50 0 - 4 /hpf    RBC 0-5 0 - 2 /hpf    Epithelial cells Few 0 - 20 /lpf    Bacteria 3+ (A) None /hpf    Hyaline cast 0-2 /lpf   CBC WITH AUTOMATED DIFF    Collection Time: 12/21/21  8:09 PM   Result Value Ref Range    WBC 13.1 4.6 - 13.2 K/uL RBC 3.88 (L) 4.20 - 5.30 M/uL    HGB 11.7 (L) 12.0 - 16.0 g/dL    HCT 36.9 35.0 - 45.0 %    MCV 95.1 78.0 - 100.0 FL    MCH 30.2 24.0 - 34.0 PG    MCHC 31.7 31.0 - 37.0 g/dL    RDW 16.8 (H) 11.6 - 14.5 %    PLATELET 262 908 - 536 K/uL    MPV 10.3 9.2 - 11.8 FL    NRBC 0.0 0.0  WBC    ABSOLUTE NRBC 0.00 0.00 - 0.01 K/uL    NEUTROPHILS 76 (H) 40 - 73 %    LYMPHOCYTES 16 (L) 21 - 52 %    MONOCYTES 8 3 - 10 %    EOSINOPHILS 0 0 - 5 %    BASOPHILS 0 0 - 2 %    IMMATURE GRANULOCYTES 0 0 - 0.5 %    ABS. NEUTROPHILS 9.9 (H) 1.8 - 8.0 K/UL    ABS. LYMPHOCYTES 2.0 0.9 - 3.6 K/UL    ABS. MONOCYTES 1.1 0.05 - 1.2 K/UL    ABS. EOSINOPHILS 0.0 0.0 - 0.4 K/UL    ABS. BASOPHILS 0.0 0.0 - 0.1 K/UL    ABS. IMM. GRANS. 0.0 0.00 - 0.04 K/UL    DF AUTOMATED     METABOLIC PANEL, COMPREHENSIVE    Collection Time: 12/21/21  8:09 PM   Result Value Ref Range    Sodium 141 135 - 145 mmol/L    Potassium 4.6 3.2 - 5.1 mmol/L    Chloride 106 94 - 111 mmol/L    CO2 26 21 - 33 mmol/L    Anion gap 9 mmol/L    Glucose 120 (H) 70 - 110 mg/dL    BUN 21 9 - 21 mg/dL    Creatinine 0.90 0.70 - 1.20 mg/dL    BUN/Creatinine ratio 23      GFR est AA >60 ml/min/1.73m2    GFR est non-AA 58 ml/min/1.73m2    Calcium 10.5 8.5 - 10.5 mg/dL    Bilirubin, total 0.6 0.2 - 1.0 mg/dL    AST (SGOT) 21 14 - 74 U/L    ALT (SGPT) 13 3 - 52 U/L    Alk.  phosphatase 57 38 - 126 U/L    Protein, total 7.2 6.1 - 8.4 g/dL    Albumin 3.5 3.5 - 4.7 g/dL    Globulin 3.7 g/dL    A-G Ratio 0.9     TROPONIN I    Collection Time: 12/21/21  8:09 PM   Result Value Ref Range    Troponin-I, Qt. <0.02 (L) 0.02 - 0.05 ng/mL   PROTHROMBIN TIME + INR    Collection Time: 12/21/21  8:09 PM   Result Value Ref Range    Prothrombin time 14.8 11.5 - 15.2 sec    INR 1.2 0.8 - 1.2     METABOLIC PANEL, BASIC    Collection Time: 12/22/21  4:07 AM   Result Value Ref Range    Sodium 143 135 - 145 mmol/L    Potassium 4.6 3.2 - 5.1 mmol/L    Chloride 108 94 - 111 mmol/L    CO2 27 21 - 33 mmol/L Anion gap 8 mmol/L    Glucose 108 70 - 110 mg/dL    BUN 21 9 - 21 mg/dL    Creatinine 1.00 0.70 - 1.20 mg/dL    BUN/Creatinine ratio 21      GFR est AA >60 ml/min/1.73m2    GFR est non-AA 51 ml/min/1.73m2    Calcium 10.9 (H) 8.5 - 10.5 mg/dL   CBC WITH AUTOMATED DIFF    Collection Time: 12/22/21  4:07 AM   Result Value Ref Range    WBC 12.7 4.6 - 13.2 K/uL    RBC 3.93 (L) 4.20 - 5.30 M/uL    HGB 11.7 (L) 12.0 - 16.0 g/dL    HCT 37.3 35.0 - 45.0 %    MCV 94.9 78.0 - 100.0 FL    MCH 29.8 24.0 - 34.0 PG    MCHC 31.4 31.0 - 37.0 g/dL    RDW 16.8 (H) 11.6 - 14.5 %    PLATELET 266 783 - 733 K/uL    MPV 10.1 9.2 - 11.8 FL    NRBC 0.0 0.0  WBC    ABSOLUTE NRBC 0.00 0.00 - 0.01 K/uL    NEUTROPHILS 71 40 - 73 %    LYMPHOCYTES 18 (L) 21 - 52 %    MONOCYTES 11 (H) 3 - 10 %    EOSINOPHILS 0 0 - 5 %    BASOPHILS 0 0 - 2 %    IMMATURE GRANULOCYTES 0 0 - 0.5 %    ABS. NEUTROPHILS 8.9 (H) 1.8 - 8.0 K/UL    ABS. LYMPHOCYTES 2.3 0.9 - 3.6 K/UL    ABS. MONOCYTES 1.3 (H) 0.05 - 1.2 K/UL    ABS. EOSINOPHILS 0.0 0.0 - 0.4 K/UL    ABS. BASOPHILS 0.1 0.0 - 0.1 K/UL    ABS. IMM. GRANS. 0.1 (H) 0.00 - 0.04 K/UL    DF AUTOMATED         Radiologic Studies -   [unfilled]  CT Results  (Last 48 hours)               12/21/21 1833  CT HEAD WO CONT Final result    Impression:      No acute intracranial abnormalities. Narrative:  EXAM: CT head       INDICATION: Fall       COMPARISON: None. TECHNIQUE: Axial CT imaging of the head was performed without intravenous   contrast. One or more dose reduction techniques were used on this CT: automated   exposure control, adjustment of the mAs and/or kVp according to patient size,   and iterative reconstruction techniques. The specific techniques used on this   CT exam have been documented in the patient's electronic medical record.  Digital   Imaging and Communications in Medicine (DICOM) format image data are available   to nonaffiliated external healthcare facilities or entities on a secure, media   free, reciprocally searchable basis with patient authorization for at least a   12-month period after this study. _______________       FINDINGS:       BRAIN AND POSTERIOR FOSSA: The sulci, folia, ventricles and basal cisterns are   within normal limits for the patient's age. There is no intracranial hemorrhage,   mass effect, or midline shift. There are no areas of abnormal parenchymal   attenuation. There is a small old lacunar infarct in right thalamus. EXTRA-AXIAL SPACES AND MENINGES: There are no abnormal extra-axial fluid   collections. CALVARIUM: Intact. SINUSES: Clear. OTHER: None.       _______________           12/21/21 1833  CT KNEE LT WO CONT Final result    Impression:      Total knee arthroplasty present. There is a severely comminuted supracondylar   fracture the distal femur without dislocation. No loosening of the hardware. No   other fractures seen. Lipohemarthrosis and soft tissue contusion of the thigh. Narrative:  EXAM: CT of the left knee       INDICATION: Left knee injury       COMPARISON: None. TECHNIQUE: Axial CT imaging of the left knee was performed without intravenous   contrast. Multiplanar reformats were generated. One or more dose reduction   techniques were used on this CT: automated exposure control, adjustment of the   mAs and/or kVp according to patient size, and iterative reconstruction   techniques. The specific techniques used on this CT exam have been documented   in the patient's electronic medical record. Digital Imaging and Communications   in Medicine (DICOM) format image data are available to nonaffiliated external   healthcare facilities or entities on a secure, media free, reciprocally   searchable basis with patient authorization for at least a 12-month period after   this study. _______________       FINDINGS:       Osseous structures: There is osteopenia. There is a total knee arthroplasty.    There is a severely comminuted supracondylar fracture of the distal femur. There   is no dislocation of the knee joint. There is no loosening of the hardware. No   other fractures identified. No loosening of the hardware. Soft tissues: There is hemarthrosis. Moderate calcific arteriosclerosis present. There is deep soft tissue contusion of the thigh musculature. _______________               CXR Results  (Last 48 hours)    None          Medical Decision Making and ED Course   - I am the first and primary provider for this patient AND AM THE PRIMARY PROVIDER OF RECORD. - I reviewed the vital signs, available nursing notes, past medical history, past surgical history, family history and social history. - Initial assessment performed. The patients presenting problems have been discussed, and the staff are in agreement with the care plan formulated and outlined with them. I have encouraged them to ask questions as they arise throughout their visit. Vital Signs-Reviewed the patient's vital signs. Patient Vitals for the past 12 hrs:   Temp Pulse Resp BP SpO2   12/22/21 0710 97.2 °F (36.2 °C) 78 16 (!) 123/41 100 %   12/22/21 0429 97.6 °F (36.4 °C) 79 18 127/60 96 %   12/22/21 0400 -- 80 -- -- --   12/22/21 0000 -- 80 -- -- --   12/21/21 2242 97.5 °F (36.4 °C) 84 18 (!) 149/52 95 %   12/21/21 2134 -- 69 18 135/66 97 %   12/21/21 2011 -- 62 20 (!) 160/110 --       EKG interpretation: (Preliminary): Performed at 6:46p, and read at 6:46p  Rhythm: normal sinus rhythm; and regular . Rate (approx.): 87; Axis: normal; CA Short ; QRS interval: normal ; ST/T wave: normal; Other findings: borderline ekg.       Records Reviewed: Nursing Notes and Old Medical Records    The patient presents with     ED Course:       ED Course as of 12/22/21 0717   Tue Dec 21, 2021   2058 CT KNEE LT WO CONT [DC]   2107 CBC WITH AUTOMATED DIFF(!):    WBC 13.1   RBC 3.88(!)   HGB 11.7(!)   HCT 36.9   MCV 95.1   MCH 30.2   MCHC 31.7 RDW 16.8(!)   PLATELET 285   MPV 43.6   NRBC 0.0   ABSOLUTE NRBC 0.00   NEUTROPHILS 76(!)   LYMPHOCYTES 16(!)   MONOCYTES 8   EOSINOPHILS 0   BASOPHILS 0   IMMATURE GRANULOCYTES 0   ABS. NEUTROPHILS 9.9(!)   ABS. LYMPHOCYTES 2.0   ABS. MONOCYTES 1.1   ABS. EOSINOPHILS 0.0   ABS. BASOPHILS 0.0   ABS. IMM. GRANS. 0.0   DF AUTOMATED [DC]   2107 TROPONIN I(!):    Troponin-I, Qt. <0.02(!) [DC]   2107 COMPREHENSIVE METABOLIC PANEL(!):    Sodium 141   Potassium 4.6   Chloride 106   CO2 26   Anion gap 9   Glucose 120(!)   BUN 21   Creatinine 0.90   BUN/Creatinine ratio 23   GFR est AA >60   GFR est non-AA 58   Calcium 10.5   Bilirubin, total 0.6   AST 21   ALT 13   Alk. phosphatase 57   Protein, total 7.2   Albumin 3.5   Globulin 3.7   A-G Ratio 0.9 [DC]      ED Course User Index  [DC] Beulah Hernandze DO         Provider Notes (Medical Decision Making):     MDM  Number of Diagnoses or Management Options     Amount and/or Complexity of Data Reviewed  Clinical lab tests: ordered and reviewed  Tests in the radiology section of CPT®: ordered and reviewed    Risk of Complications, Morbidity, and/or Mortality  General comments: Spoke with AYLA Brown who says she doesn't think the patient has been able to walk in months. Able to scoot around in her wheelchair. Endorsed to Dr Nolan Espinosa at 7:15pm       Patient accepted in transition at change of shift, briefly evaluated, stable and in no distress. Plan for hospital admission upon completion of labs. Labs reviewed and discussed, discussed with hospitalist Stefano Dobson PA-C, patient is accepted for admission with consultation to orthopedics of Dr. Lina Arevalo. Consultations:       Consultations: - Orthopedic Surgery Consultant: Dr. Izabela Mistry: We have asked for emergent assistance with regard to this patient. We have discussed the patients HPI, ROS, PE and labd and imaging results this far.   They will come and evaluate the patient for their acute orthopedic surgical needs and further treatment with possible admission. Admit Hospitalist. Immobilize knee. He will discuss with family tomorrow. Procedures and Critical Care       Performed by: Keren Cueva MD  PROCEDURES:  Procedures                     Disposition     Disposition: Condition stable  Admitted to telemetry the case was discussed with the admitting physician Adelita Juan PA-C for Dr. Bc Perrin    Admitted       1. Current Discharge Medication List      CONTINUE these medications which have NOT CHANGED    Details   aspirin 81 mg chewable tablet       zinc gluconate 100 mg tab       Mucus Relief  mg ER tablet       cholecalciferol (VITAMIN D3) (1000 Units /25 mcg) tablet       Vitamin C 500 mg tablet       Arthritis Pain Relief 650 mg TbER       allopurinoL (ZYLOPRIM) 100 mg tablet Take 1 Tablet by mouth daily. furosemide (LASIX) 40 mg tablet Take 1 Tablet by mouth daily. levothyroxine (SYNTHROID) 25 mcg tablet Take 1 Tablet by mouth Daily (before breakfast). loratadine (CLARITIN) 10 mg tablet Take 10 mg by mouth.      meloxicam (MOBIC) 7.5 mg tablet Take 1 Tablet by mouth daily. potassium chloride (K-DUR, KLOR-CON M20) 20 mEq tablet Take 1 Tablet by mouth daily. hydrOXYzine pamoate (VISTARIL) 25 mg capsule Take 25 mg by mouth two (2) times daily as needed. memantine (NAMENDA) 10 mg tablet Take 1 Tablet by mouth daily. melatonin 5 mg tablet Take 2 Tablets by mouth nightly. traZODone (DESYREL) 50 mg tablet Take 0.5 Tablets by mouth nightly. 2.   Follow-up Information    None       3. Return to ED if worse   4. Current Discharge Medication List          Diagnosis     Clinical Impression:   1. Closed displaced supracondylar fracture of distal end of femur without intracondylar extension, initial encounter ([facility] [place] [address]    2.  Urinary tract infection without hematuria, site unspecified        Attestations:    Keren Cueva MD    Please note that this dictation was completed with Dragon, the computer voice recognition software. Quite often unanticipated grammatical, syntax, homophones, and other interpretive errors are inadvertently transcribed by the computer software. Please disregard these errors. Please excuse any errors that have escaped final proofreading. Thank you.

## 2021-12-22 VITALS
OXYGEN SATURATION: 98 % | HEART RATE: 98 BPM | DIASTOLIC BLOOD PRESSURE: 44 MMHG | TEMPERATURE: 97.1 F | BODY MASS INDEX: 27.74 KG/M2 | HEIGHT: 68 IN | SYSTOLIC BLOOD PRESSURE: 107 MMHG | RESPIRATION RATE: 18 BRPM | WEIGHT: 183 LBS

## 2021-12-22 LAB
ANION GAP SERPL CALC-SCNC: 8 MMOL/L
ATRIAL RATE: 88 BPM
BASOPHILS # BLD: 0.1 K/UL (ref 0–0.1)
BASOPHILS NFR BLD: 0 % (ref 0–2)
BUN SERPL-MCNC: 21 MG/DL (ref 9–21)
BUN/CREAT SERPL: 21
CA-I BLD-MCNC: 10.9 MG/DL (ref 8.5–10.5)
CALCULATED P AXIS, ECG09: 57 DEGREES
CALCULATED R AXIS, ECG10: 53 DEGREES
CALCULATED T AXIS, ECG11: 57 DEGREES
CHLORIDE SERPL-SCNC: 108 MMOL/L (ref 94–111)
CO2 SERPL-SCNC: 27 MMOL/L (ref 21–33)
CREAT SERPL-MCNC: 1 MG/DL (ref 0.7–1.2)
DIAGNOSIS, 93000: NORMAL
DIFFERENTIAL METHOD BLD: ABNORMAL
EOSINOPHIL # BLD: 0 K/UL (ref 0–0.4)
EOSINOPHIL NFR BLD: 0 % (ref 0–5)
ERYTHROCYTE [DISTWIDTH] IN BLOOD BY AUTOMATED COUNT: 16.8 % (ref 11.6–14.5)
GLUCOSE SERPL-MCNC: 108 MG/DL (ref 70–110)
HCT VFR BLD AUTO: 37.3 % (ref 35–45)
HGB BLD-MCNC: 11.7 G/DL (ref 12–16)
IMM GRANULOCYTES # BLD AUTO: 0.1 K/UL (ref 0–0.04)
IMM GRANULOCYTES NFR BLD AUTO: 0 % (ref 0–0.5)
INR PPP: 1.2 (ref 0.8–1.2)
LYMPHOCYTES # BLD: 2.3 K/UL (ref 0.9–3.6)
LYMPHOCYTES NFR BLD: 18 % (ref 21–52)
MCH RBC QN AUTO: 29.8 PG (ref 24–34)
MCHC RBC AUTO-ENTMCNC: 31.4 G/DL (ref 31–37)
MCV RBC AUTO: 94.9 FL (ref 78–100)
MONOCYTES # BLD: 1.3 K/UL (ref 0.05–1.2)
MONOCYTES NFR BLD: 11 % (ref 3–10)
NEUTS SEG # BLD: 8.9 K/UL (ref 1.8–8)
NEUTS SEG NFR BLD: 71 % (ref 40–73)
NRBC # BLD: 0 K/UL (ref 0–0.01)
NRBC BLD-RTO: 0 PER 100 WBC
P-R INTERVAL, ECG05: 55 MS
PLATELET # BLD AUTO: 188 K/UL (ref 135–420)
PMV BLD AUTO: 10.1 FL (ref 9.2–11.8)
POTASSIUM SERPL-SCNC: 4.6 MMOL/L (ref 3.2–5.1)
PROTHROMBIN TIME: 14.8 SEC (ref 11.5–15.2)
Q-T INTERVAL, ECG07: 368 MS
QRS DURATION, ECG06: 87 MS
QTC CALCULATION (BEZET), ECG08: 443 MS
RBC # BLD AUTO: 3.93 M/UL (ref 4.2–5.3)
SODIUM SERPL-SCNC: 143 MMOL/L (ref 135–145)
TSH SERPL DL<=0.05 MIU/L-ACNC: 2.07 UIU/ML (ref 0.35–6.2)
VENTRICULAR RATE, ECG03: 87 BPM
WBC # BLD AUTO: 12.7 K/UL (ref 4.6–13.2)

## 2021-12-22 PROCEDURE — 74011250637 HC RX REV CODE- 250/637: Performed by: PHYSICIAN ASSISTANT

## 2021-12-22 PROCEDURE — 84443 ASSAY THYROID STIM HORMONE: CPT

## 2021-12-22 PROCEDURE — 87086 URINE CULTURE/COLONY COUNT: CPT

## 2021-12-22 PROCEDURE — 97166 OT EVAL MOD COMPLEX 45 MIN: CPT

## 2021-12-22 PROCEDURE — 80048 BASIC METABOLIC PNL TOTAL CA: CPT

## 2021-12-22 PROCEDURE — 97161 PT EVAL LOW COMPLEX 20 MIN: CPT

## 2021-12-22 PROCEDURE — 85025 COMPLETE CBC W/AUTO DIFF WBC: CPT

## 2021-12-22 RX ORDER — ENOXAPARIN SODIUM 100 MG/ML
30 INJECTION SUBCUTANEOUS DAILY
Qty: 21 EACH | Refills: 0 | Status: SHIPPED
Start: 2021-12-22 | End: 2022-05-09

## 2021-12-22 RX ORDER — LEVOFLOXACIN 250 MG/1
250 TABLET ORAL DAILY
Qty: 3 TABLET | Refills: 0 | Status: SHIPPED
Start: 2021-12-22 | End: 2021-12-26

## 2021-12-22 RX ADMIN — Medication 1000 UNITS: at 09:32

## 2021-12-22 RX ADMIN — FUROSEMIDE 40 MG: 40 TABLET ORAL at 09:32

## 2021-12-22 RX ADMIN — ALLOPURINOL 100 MG: 100 TABLET ORAL at 09:32

## 2021-12-22 RX ADMIN — CETIRIZINE HYDROCHLORIDE 10 MG: 10 TABLET, FILM COATED ORAL at 09:32

## 2021-12-22 RX ADMIN — LEVOTHYROXINE SODIUM 25 MCG: 0.03 TABLET ORAL at 09:32

## 2021-12-22 RX ADMIN — POTASSIUM CHLORIDE 20 MEQ: 10 TABLET, EXTENDED RELEASE ORAL at 09:31

## 2021-12-22 RX ADMIN — ASPIRIN 81 MG: 81 TABLET, CHEWABLE ORAL at 09:32

## 2021-12-22 RX ADMIN — OXYCODONE HYDROCHLORIDE AND ACETAMINOPHEN 500 MG: 500 TABLET ORAL at 09:32

## 2021-12-22 RX ADMIN — ACETAMINOPHEN 650 MG: 325 TABLET ORAL at 09:31

## 2021-12-22 RX ADMIN — Medication 10 ML: at 06:12

## 2021-12-22 RX ADMIN — MELOXICAM 7.5 MG: 7.5 TABLET ORAL at 09:32

## 2021-12-22 RX ADMIN — MEMANTINE HYDROCHLORIDE 10 MG: 5 TABLET ORAL at 09:32

## 2021-12-22 NOTE — PROGRESS NOTES
Reason for Admission: Chart reviewed and noted patient presented from [facility] to the ER. Pt fell out of bed resulting in injury to the left lower extremity. Dx: Left Femur Fracture    PMH: HTN, Bradycardia, Late Onset Alzheimer's Disease without Behavioral Disturbance, Osteoarthritis Involving Multiple Joints, Hypoglycemia, Hypothyroidism, Idiopathic Chronic Gout                      RUR Score: 12%                   Plan for utilizing home health: TBD         PCP: First and Last name:  Curtis Whitman NP     Name of Practice:    Are you a current patient: Yes/No:    Approximate date of last visit:    Can you participate in a virtual visit with your PCP:                     Current Advanced Directive/Advance Care Plan: DNR      Healthcare Decision Maker: Itzel Frances here to complete [address] including selection of the Healthcare Decision Maker Relationship (ie \"Primary\")                             Transition of Care Plan: TBD    Care Management Intervention  PCP Verified by CM: Yes  Transition of Care Consult (CM Consult): Discharge Planning  Current Support Network: Nina Cordoba [phone]. Patient came from [facility] and will be going back there at discharge.

## 2021-12-22 NOTE — DISCHARGE INSTRUCTIONS
Per Ortho-  Bed to wheelchair only if wheelchair leg is straight out. No bending of affected knee/leg. Check skin every 12 hours. Avoid narcotics  Non weight bearing to affected leg  Repeat xrays in 10 days and call Dr Garland White office when results are back. If any questions do not hesitate to call Dr Garlnad White office.

## 2021-12-22 NOTE — PROGRESS NOTES
0700- assumed care of pt    0720- PCT reported BP of 123/41 checked manually got 120/60. VSS, mittens on securely with no signs of injury. 0930- pt tearful c/o pain in leg PRN Tylenol given per MAR, removed pt's NPO status, administered scheduled medications per [facility], door open, video monitoring in place.

## 2021-12-22 NOTE — DISCHARGE SUMMARY
Discharge Summary       PATIENT ID: Jackie Carnes  MRN: [id number]   YOB: 1922    DATE OF ADMISSION: 12/21/2021  4:19 PM    DATE OF DISCHARGE: 12/22/21    PRIMARY CARE PROVIDER: Benji Valadez NP     ATTENDING PHYSICIAN: Migdalia Gutierrez MD  DISCHARGING PROVIDER: Migdalia Gutierrez MD        CONSULTATIONS: None    PROCEDURES/SURGERIES: * No surgery found *    ADMITTING [address] [facility] COURSE:   Campbell Miner is a 80 y.o.  female who presents with past medical history of hypertension and bradycardia who fell out of bed resulting in injury to the left lower extremity. On exam although patient is a poor historian she complains of pain in the area and difficulty with active movement. Examined in her room found to be in mild distress initially sleeping in immobilizer. Grossly intact neurologically.     Orthopedics will be evaluating in the morning.       Patient has been admitted for evaluation and treatment of the problems noted in the plan above expected 2 midnight length of stay.         DISCHARGE DIAGNOSES / PLAN:      Femur fracture, left (HCC)  - CT shows a severely comminuted supracondylar fracture of the distal femur.   -Case discussed with Dr. Mindy Orr who requests admission to hospital service, immobilizer  -Patient given p.m. dose medications then made n.p.o. after midnight    -- per ortho, no surgery at this time, see instructions below     UTI (urinary tract infection)  -Rocephin 1 g IV piggyback every 24 hours  -Urinalysis shows positive blood, nitrites and large leukoesterase  -Urine culture pending     Late onset Alzheimer's disease without behavioral disturbance ([facility] [address]  -Poor historian  -Continue with Namenda     Other specified hypothyroidism  -Continue with Synthroid      FOLLOW UP APPOINTMENTS:    Follow-up Information     Follow up With Specialties Details Why Contact Info    Benji Valadez NP Nurse Practitioner   [address]  [address] [facility] [address]  [phone]             Lovenox for dvt proph,   Levofloxacin for uti      Diet- same as prior to admit  Per Ortho-  Bed to wheelchair only if wheelchair leg is straight out. No bending of affected knee/leg. Check skin every 12 hours. Avoid narcotics  Non weight bearing to affected leg  Repeat xrays in 10 days and call Dr Lillian Andino office when results are back. If any questions do not hesitate to call Dr Lillian Andino office. DISCHARGE MEDICATIONS:  Current Discharge Medication List      START taking these medications    Details   enoxaparin (Lovenox) 30 mg/0.3 mL injection 0.3 mL by SubCUTAneous route daily. Qty: 21 Each, Refills: 0  Start date: 12/22/2021      levoFLOXacin (LEVAQUIN) 250 mg tablet Take 1 Tablet by mouth daily for 3 days. Qty: 3 Tablet, Refills: 0  Start date: 12/22/2021, End date: 12/25/2021         CONTINUE these medications which have NOT CHANGED    Details   aspirin 81 mg chewable tablet       zinc gluconate 100 mg tab       Mucus Relief  mg ER tablet       cholecalciferol (VITAMIN D3) (1000 Units /25 mcg) tablet       Vitamin C 500 mg tablet       Arthritis Pain Relief 650 mg TbER       allopurinoL (ZYLOPRIM) 100 mg tablet Take 1 Tablet by mouth daily. furosemide (LASIX) 40 mg tablet Take 1 Tablet by mouth daily. levothyroxine (SYNTHROID) 25 mcg tablet Take 1 Tablet by mouth Daily (before breakfast). loratadine (CLARITIN) 10 mg tablet Take 10 mg by mouth.      meloxicam (MOBIC) 7.5 mg tablet Take 1 Tablet by mouth daily. potassium chloride (K-DUR, KLOR-CON M20) 20 mEq tablet Take 1 Tablet by mouth daily. hydrOXYzine pamoate (VISTARIL) 25 mg capsule Take 25 mg by mouth two (2) times daily as needed. memantine (NAMENDA) 10 mg tablet Take 1 Tablet by mouth daily. melatonin 5 mg tablet Take 2 Tablets by mouth nightly. traZODone (DESYREL) 50 mg tablet Take 0.5 Tablets by mouth nightly.                NOTIFY YOUR PHYSICIAN FOR ANY OF THE FOLLOWING: Fever over 101 degrees for 24 hours. Chest pain, shortness of breath, fever, chills, nausea, vomiting, diarrhea, change in mentation, falling, weakness, bleeding. Severe pain or pain not relieved by medications. Or, any other signs or symptoms that you may have questions about. DISPOSITION:    Home With:   OT  PT  HH  RN       Long term SNF/Inpatient Rehab    Independent/assisted living    Hospice    Other:       PATIENT CONDITION AT DISCHARGE:     Functional status    Poor     Deconditioned     Independent      Cognition     Lucid     Forgetful     Dementia      Catheters/lines (plus indication)    Cason     PICC     PEG     None      Code status     Full code     DNR      PHYSICAL EXAMINATION AT DISCHARGE:  General:          sleeping, no distress, appears stated age. HEENT:           Atraumatic, anicteric sclerae, pink conjunctivae                          No oral ulcers, mucosa moist, throat clear, dentition fair  Neck:               Supple, symmetrical  Lungs:             Clear to auscultation bilaterally. No Wheezing or Rhonchi. No rales. Chest wall:      No tenderness  No Accessory muscle use. Heart:              Regular  rhythm,  No  murmur   No edema  Abdomen:        Soft, non-tender. Not distended. Bowel sounds normal  Extremities:     No cyanosis. No clubbing,                            Skin turgor normal, Capillary refill normal  Skin:                Not pale. Not Jaundiced  No rashes   Psych:             Not anxious or agitated.   Neurologic:      Alert, moves all extremities,      CHRONIC MEDICAL DIAGNOSES:  Problem List as of 12/22/2021 Date Reviewed: 12/21/2021          Codes Class Noted - Resolved    Femur fracture, left ([facility] ICD-10-CM: E65.63OI  ICD-9-CM: 821.00  12/21/2021 - Present        UTI (urinary tract infection) ICD-10-CM: N39.0  ICD-9-CM: 599.0  12/21/2021 - Present        Late onset Alzheimer's disease without behavioral disturbance ([facility] ICD-10-CM: G30.1, F02.80  ICD-9-CM: 331.0, 294.10  8/11/2020 - Present        Primary osteoarthritis involving multiple joints ICD-10-CM: M89.49  ICD-9-CM: 715.98  8/11/2020 - Present        Idiopathic chronic gout of right ankle without tophus ICD-10-CM: M1A.0710  ICD-9-CM: 274.02  8/11/2020 - Present        Essential hypertension ICD-10-CM: I10  ICD-9-CM: 401.9  8/11/2020 - Present        Other specified hypothyroidism ICD-10-CM: E03.8  ICD-9-CM: 244.8  8/11/2020 - Present        Bradycardia ICD-10-CM: R00.1  ICD-9-CM: 427.89  8/11/2020 - Present        Hypoglycemia ICD-10-CM: E16.2  ICD-9-CM: 251.2  8/11/2020 - Present              Greater than 35 minutes were spent with the patient on counseling and coordination of care    Signed:   Americo Wiseman MD  12/22/2021  10:17 AM

## 2021-12-22 NOTE — PROGRESS NOTES
Pt continues to pull at iv and catheter. Michael ZAMORA made aware and order obtained for mittens.   Restraint order and flowsheet initiated

## 2021-12-22 NOTE — PROGRESS NOTES
Problem: Mobility Impaired (Adult and Pediatric)  Goal: *Acute Goals and Plan of Care (Insert Text)  Description: Pt with very limited mobility due to pain. She has dementia and is well known to this therapist. She had difficulty standing prior to this injury and is not safe at this time to attempt OOB mobility. Would recommend a kay lift for OOB transfers if desired at this time. PLOF: Pt would sit up in a w/c at the LTC facility and had assistance for transfers and ADLs. She was not ambulatory. Outcome: Resolved/Met     Problem: Patient Education: Go to Patient Education Activity  Goal: Patient/Family Education  Outcome: Resolved/Met   PHYSICAL THERAPY EVALUATION AND DISCHARGE    Patient: Virgilio So (1 y.o. female)  Date: 12/22/2021  Primary Diagnosis: Femur fracture, left ([facility] [address] [S72.92XA]        Precautions:   NWB,Other (comment) (immobilizer to remain in place on LLE)    ASSESSMENT :  Based on the objective data described below, the patient presents with L distal femur fracture and she is in an immobilizer and is NWB. She has dementia and was limited with mobility prior to fx. She is having significant pain with movement and requires total assistance for bed mobility. She is not safe to attempt standing transfers and would likely have difficulty with sliding board transfers due to dementia. If desired a kya lift can be used for OOB transfers. She may benefit from therapy once her WB status changes but she is not a candidate at this time. Patient does not require further skilled intervention at this level of care. PLAN :  Recommendations and Planned Interventions:   No formal PT needs identified at this time. Discharge Recommendations: LTC  Further Equipment Recommendations for Discharge: N/A     SUBJECTIVE:   Patient stated \"my leg hurts.     OBJECTIVE DATA SUMMARY:     Past Medical History:   Diagnosis Date    Bradycardia     Dementia ([facility] [address]     Gout     Hypertension Hypoglycemia     OA (osteoarthritis)    History reviewed. No pertinent surgical history. Barriers to Learning/Limitations: yes;  altered mental status (i.e.Sedation, Confusion)  Compensate with: Visual Cues, Verbal Cues, and Tactile Cues  Home Situation:   Home Situation  Home Environment: Long term care  One/Two Story Residence: One story  Living Alone: No  Support Systems: [facility]  Patient Expects to be Discharged to[de-identified] Long-term care  Current DME Used/Available at Home: [facility] bed  Critical Behavior:  Neurologic State: Alert;Confused;Drowsy  Orientation Level: Oriented to person  Cognition: Follows commands     Psychosocial  Patient Behaviors: Cooperative  Purposeful Interaction: Yes  Pt Identified Daily Priority: Clinical issues (comment)  Caritas Process: Nurture loving kindness; Attend basic human needs  Caring Interventions: Reassure  Reassure: Caring rounds  Therapeutic Modalities: Intentional therapeutic touch    Strength:    Strength: Generally decreased, functional  RUE:4/5  LUE:4/5  RLE:3+/5  LLE:2-/5  Tone & Sensation:   Sensation: Intact  Range Of Motion:   AROM: Generally decreased, functional  PROM: Generally decreased, functional  L knee in immobilizer  Functional Mobility:  Bed Mobility:  Rolling: Total assistance  Supine to Sit: Total assistance (Unable to fully complete due to pain)  Transfers:  Sit to Stand: Other (comment) (not appropriate at this time)  Pain:  Pain level pre-treatment: ?/10   Pain level post-treatment: ?/10  Pain Location: Pt moaning in pain upon entry, increased pain when repositioning L LE  Pain Intervention(s): Medication (see MAR); Rest, Ice, Repositioning   Response to intervention: Nurse notified, See doc flow    Activity Tolerance:   Poor  Please refer to the flowsheet for vital signs taken during this treatment.   After treatment:   []         Patient left in no apparent distress sitting up in chair  [x]         Patient left in no apparent distress in bed  [x]         Call bell left within reach  [x]         Nursing notified  []         Caregiver present  [x]         Bed alarm activated  []         SCDs applied    COMMUNICATION/EDUCATION:   []         Role of Physical Therapy in the acute care setting. []         Fall prevention education was provided and the patient/caregiver indicated understanding. []         Patient/family have participated as able in goal setting and plan of care. []         Patient/family agree to work toward stated goals and plan of care. []         Patient understands intent and goals of therapy, but is neutral about his/her participation. [x]         Patient is unable to participate in goal setting/plan of care: ongoing with therapy staff.  []         Other:     Thank you for this referral.  Alcon Fletcher, PT, DPT   Time Calculation: 18 mins

## 2021-12-22 NOTE — PROGRESS NOTES
Routine assessment completed. Pt uncomfortable with repositioning but settles down when done. Soft brace intact to left knee. Cason patent and draining yellow urine. Pt continues to pull at tele monitor and pull leads off at intervals. Bed alarm on and video monitor in use. Side rails up.

## 2021-12-22 NOTE — PROGRESS NOTES
OCCUPATIONAL THERAPY EVALUATION/DISCHARGE    Patient: Nicolette Malone (11 y.o. female)  Date: 12/22/2021  Primary Diagnosis: Femur fracture, left (HCC) [S72.92XA]       Precautions:  NWB,Other (comment) (immobilizer to remain in place on LLE)  PLOF: LTC resident of . Requires extensive assist for all ADLs and mechanical lift for mobility    ASSESSMENT AND RECOMMENDATIONS:  Based on the objective data described below, the patient presents with declines in ADLs and mobility. Skilled occupational therapy is not indicated at this time. Discharge Recommendations: return to  as LTC resident   Further Equipment Recommendations for Discharge: N/A      SUBJECTIVE:   Patient stated ow, this knee hurts.     OBJECTIVE DATA SUMMARY:     Past Medical History:   Diagnosis Date    Bradycardia     Dementia ([facility] [address]     Gout     Hypertension     Hypoglycemia     OA (osteoarthritis)    History reviewed. No pertinent surgical history.   Barriers to Learning/Limitations: yes;  cognitive  Compensate with: visual, verbal, tactile, kinesthetic cues/model    Home Situation:   Home Situation  Home Environment: Long term care  One/Two Story Residence: One story  Living Alone: No  Support Systems: [facility]  Patient Expects to be Discharged to[de-identified] Long-term care  Current DME Used/Available at Home: Wheelchair,Hospital bed  [x]     Right hand dominant   []     Left hand dominant    Cognitive/Behavioral Status:  Neurologic State: Alert;Confused;Drowsy  Orientation Level: Oriented to person  Cognition: Follows commands       Skin: intact   Edema: none noted   Vision/Perceptual:       Glasses; appears wfls                               Coordination: BUE  Coordination: Within functional limits            Balance:    posterior lean     Strength: BUE    Strength: Generally decreased, functional              Tone & Sensation: BUE       Sensation: Intact                    Range of Motion: BUE    AROM: Generally decreased, functional  PROM: Generally decreased, functional                      Functional Mobility and Transfers for ADLs:  Bed Mobility:  Rolling: Total assistance  Supine to Sit: Total assistance (Unable to fully complete due to pain)        Transfers:  Sit to Stand: Other (comment) (not appropriate at this time)                                    ADL Assessment:    Pt requires extensive/ total assist for ADLs ( which is not different than previously); plans to return to LTC            Pain:  Pain level pre-treatment: 3/10   Pain level post-treatment:3/10   Pain Intervention(s): Medication (see MAR); Rest, Ice, Repositioning  Response to intervention: Nurse notified, See doc flow    Activity Tolerance:   Good     Please refer to the flowsheet for vital signs taken during this treatment. After treatment:   []  Patient left in no apparent distress sitting up in chair  [x]  Patient left in no apparent distress in bed  [x]  Call bell left within reach  [x]  Nursing notified  []  Caregiver present  []  Bed alarm activated    COMMUNICATION/EDUCATION:   [x]      Role of Occupational Therapy in the acute care setting  [x]      Home safety education was provided and the patient/caregiver indicated understanding. []      Patient/family have participated as able and agree with findings and recommendations. []      Patient is unable to participate in plan of care at this time. Thank you for this referral.  Mp Augustine MS, OTR/L         Parish Complexity: History: MEDIUM Complexity : Expanded review of history including physical, cognitive and psychosocial  history ; Examination: MEDIUM Complexity : 3-5 performance deficits relating to physical, cognitive , or psychosocial skils that result in activity limitations and / or participation restrictions; Decision Making:MEDIUM Complexity : Patient may present with comorbidities that affect occupational performnce.  Miniml to moderate modification of tasks or assistance (eg, physical or verbal ) with assesment(s) is necessary to enable patient to complete evaluation

## 2021-12-22 NOTE — ASSESSMENT & PLAN NOTE
- CT shows a severely comminuted supracondylar fracture of the distal femur.   -Case discussed with Dr. Rosario Welch who requests admission to hospital service, immobilizer  -Patient given p.m. dose medications then made n.p.o. after midnight  -EKG performed  -INR pending  -We will reevaluate in the morning for possible surgical intervention after Ortho evaluation and discussion with family

## 2021-12-22 NOTE — ASSESSMENT & PLAN NOTE
-Rocephin 1 g IV piggyback every 24 hours  -Urinalysis shows positive blood, nitrites and large leukoesterase  -Urine culture pending

## 2021-12-22 NOTE — PROGRESS NOTES
Pt turned and repositioned for comfort. Cason emptied of shad urine. Bilateral mittens remain in place due to confusion and pulling of tubes. Bed alarm on and bed in lowest position.

## 2021-12-22 NOTE — PROGRESS NOTES
Problem: Falls - Risk of  Goal: *Absence of Falls  Description: Document Diane Jimenez Fall Risk and appropriate interventions in the flowsheet. Outcome: Progressing Towards Goal  Note: Fall Risk Interventions:       Mentation Interventions: Adequate sleep, hydration, pain control,Bed/chair exit alarm    Medication Interventions: Bed/chair exit alarm    Elimination Interventions: Call light in reach,Bed/chair exit alarm    History of Falls Interventions: Bed/chair exit alarm         Problem: Patient Education: Go to Patient Education Activity  Goal: Patient/Family Education  Outcome: Progressing Towards Goal     Problem: Pressure Injury - Risk of  Goal: *Prevention of pressure injury  Description: Document Ramón Scale and appropriate interventions in the flowsheet.   Outcome: Progressing Towards Goal  Note: Pressure Injury Interventions:  Sensory Interventions: Assess changes in LOC    Moisture Interventions: Absorbent underpads,Apply protective barrier, creams and emollients,Internal/External urinary devices    Activity Interventions: PT/OT evaluation    Mobility Interventions: PT/OT evaluation    Nutrition Interventions: Document food/fluid/supplement intake                     Problem: Patient Education: Go to Patient Education Activity  Goal: Patient/Family Education  Outcome: Progressing Towards Goal     Problem: Non-Violent Restraints  Goal: Removal from restraints as soon as assessed to be safe  Outcome: Progressing Towards Goal  Goal: No harm/injury to patient while restraints in use  Outcome: Progressing Towards Goal  Goal: Patient's dignity will be maintained  Outcome: Progressing Towards Goal  Goal: Patient Interventions  Outcome: Progressing Towards Goal     Problem: Pain  Goal: *Control of Pain  Outcome: Progressing Towards Goal  Goal: *PALLIATIVE CARE:  Alleviation of Pain  Outcome: Progressing Towards Goal     Problem: Patient Education: Go to Patient Education Activity  Goal: Patient/Family Education  Outcome: Progressing Towards Goal

## 2021-12-23 ENCOUNTER — PATIENT OUTREACH (OUTPATIENT)
Dept: CASE MANAGEMENT | Age: 86
End: 2021-12-23

## 2021-12-23 ENCOUNTER — OFFICE VISIT (OUTPATIENT)
Dept: FAMILY MEDICINE CLINIC | Age: 86
End: 2021-12-23
Payer: MEDICARE

## 2021-12-23 DIAGNOSIS — F02.80 LATE ONSET ALZHEIMER'S DISEASE WITHOUT BEHAVIORAL DISTURBANCE (HCC): ICD-10-CM

## 2021-12-23 DIAGNOSIS — S72.455D CLOSED NONDISPLACED SUPRACONDYLAR FRACTURE OF DISTAL END OF LEFT FEMUR WITHOUT INTRACONDYLAR EXTENSION WITH ROUTINE HEALING, SUBSEQUENT ENCOUNTER: Primary | ICD-10-CM

## 2021-12-23 DIAGNOSIS — G30.1 LATE ONSET ALZHEIMER'S DISEASE WITHOUT BEHAVIORAL DISTURBANCE (HCC): ICD-10-CM

## 2021-12-23 DIAGNOSIS — F39 MOOD DISORDER (HCC): ICD-10-CM

## 2021-12-23 DIAGNOSIS — I10 ESSENTIAL HYPERTENSION: ICD-10-CM

## 2021-12-23 PROCEDURE — G8432 DEP SCR NOT DOC, RNG: HCPCS | Performed by: STUDENT IN AN ORGANIZED HEALTH CARE EDUCATION/TRAINING PROGRAM

## 2021-12-23 PROCEDURE — 99309 SBSQ NF CARE MODERATE MDM 30: CPT | Performed by: STUDENT IN AN ORGANIZED HEALTH CARE EDUCATION/TRAINING PROGRAM

## 2021-12-23 PROCEDURE — G8536 NO DOC ELDER MAL SCRN: HCPCS | Performed by: STUDENT IN AN ORGANIZED HEALTH CARE EDUCATION/TRAINING PROGRAM

## 2021-12-23 PROCEDURE — 1101F PT FALLS ASSESS-DOCD LE1/YR: CPT | Performed by: STUDENT IN AN ORGANIZED HEALTH CARE EDUCATION/TRAINING PROGRAM

## 2021-12-23 NOTE — PROGRESS NOTES
No transition of care outreach indicated due to patient discharge to a [address] [place] [address]. This episode is closed and resolved.

## 2021-12-23 NOTE — CONSULTS
Weight Loss Metrics 12/22/2021   Today's Wt 183 lb   BMI 27.83 kg/m2       Body mass index is 27.83 kg/m². Past Medical History:   Diagnosis Date    Bradycardia     Dementia ([address]     Gout     Hypertension     Hypoglycemia     OA (osteoarthritis)        History reviewed. No pertinent surgical history. Current Outpatient Medications   Medication Sig Dispense Refill    enoxaparin (Lovenox) 30 mg/0.3 mL injection 0.3 mL by SubCUTAneous route daily. 21 Each 0    levoFLOXacin (LEVAQUIN) 250 mg tablet Take 1 Tablet by mouth daily for 3 days. 3 Tablet 0    aspirin 81 mg chewable tablet       zinc gluconate 100 mg tab       Mucus Relief  mg ER tablet       cholecalciferol (VITAMIN D3) (1000 Units /25 mcg) tablet       Vitamin C 500 mg tablet       Arthritis Pain Relief 650 mg TbER       allopurinoL (ZYLOPRIM) 100 mg tablet Take 1 Tablet by mouth daily.  furosemide (LASIX) 40 mg tablet Take 1 Tablet by mouth daily.  levothyroxine (SYNTHROID) 25 mcg tablet Take 1 Tablet by mouth Daily (before breakfast).  loratadine (CLARITIN) 10 mg tablet Take 10 mg by mouth.  meloxicam (MOBIC) 7.5 mg tablet Take 1 Tablet by mouth daily.  potassium chloride (K-DUR, KLOR-CON M20) 20 mEq tablet Take 1 Tablet by mouth daily.  hydrOXYzine pamoate (VISTARIL) 25 mg capsule Take 25 mg by mouth two (2) times daily as needed.  memantine (NAMENDA) 10 mg tablet Take 1 Tablet by mouth daily.  melatonin 5 mg tablet Take 2 Tablets by mouth nightly.  traZODone (DESYREL) 50 mg tablet Take 0.5 Tablets by mouth nightly. No Known Allergies    Social History     Tobacco Use    Smoking status: Never Smoker    Smokeless tobacco: Never Used   Vaping Use    Vaping Use: Never used   Substance Use Topics    Alcohol use: Not Currently    Drug use: Never       History reviewed. No pertinent family history.     ROS   REVIEW OF SYSTEMS:     I am not able to complete the review of systems because:    The patient is intubated and sedated     The patient has altered mental status due to his acute medical problems     The patient has baseline aphasia from prior stroke(s)   X The patient has baseline dementia and is not reliable historian     The patient is in acute medical distress and unable to provide information                  Visit Vitals  BP (!) 107/44 (BP 1 Location: Left upper arm, BP Patient Position: At rest;Semi fowlers)   Pulse 98   Temp 97.1 °F (36.2 °C)   Resp 18   Ht 5' 8\" (1.727 m)   Wt 183 lb (83 kg)   SpO2 98%   BMI 27.83 kg/m²   \    X-rays and CAT scan are positive for left distal femur periprosthetic fracture. Patient is dementia, disoriented. Nonverbal.  Does not appear to be in any acute pain. Left knee -Grossly neurovascularly intact with good cap refill, Decreased Range of Motion, No crepitation, No skin lesions, mild joint effusion, No gross instability, positive point tenderness in the area of the patellar tendon, positive weakness, unable to perform active leg extension, No medial or lateral joint line tenderness, Negative Lachman's, Negative Amberly's exam.    Right Knee - Full Range of Motion, No crepitation, Grossly neurovascularly intact, Good cap refill, No skin lesion, No effusion, No gross instability, No point tenderness, No quadriceps weakness, No medial or lateral joint line tenderness, Negative Lachman's, Negative Amberly's exam.    Impression:. Left distal femur periprosthetic fracture. Plan: Plan will be conservative treatment. Repeat x-rays in 2 weeks. Strict nonweightbearing, okay for transfer with knee immobilizer. This plan was discussed with power of . We will repeat x-rays and go from there.

## 2021-12-24 LAB
BACTERIA SPEC CULT: NORMAL
COLONY COUNT,CNT: NORMAL
SPECIAL REQUESTS,SREQ: NORMAL

## 2021-12-26 VITALS
HEART RATE: 78 BPM | OXYGEN SATURATION: 96 % | TEMPERATURE: 97 F | DIASTOLIC BLOOD PRESSURE: 52 MMHG | RESPIRATION RATE: 18 BRPM | SYSTOLIC BLOOD PRESSURE: 100 MMHG

## 2021-12-26 NOTE — PROGRESS NOTES
Subjective:   Santy Ku is a 80 y.o. female who was seen for hospital follow up. She went to the ER due to a fall and suffered a left femur fracture. Ortho was consulted and did not recommend surgical intervention. She was placed on an immobilizer. Currently, she denies any pain and is in no distress. Per nursing staff, her blood pressures have been low and is currently on lasix. Home Medications    Medication Sig Start Date End Date Taking? Authorizing Provider   enoxaparin (Lovenox) 30 mg/0.3 mL injection 0.3 mL by SubCUTAneous route daily. 12/22/21   Cliff Nugent MD   levoFLOXacin (LEVAQUIN) 250 mg tablet Take 1 Tablet by mouth daily for 3 days. 12/22/21 12/25/21  Cliff Nugent MD   aspirin 81 mg chewable tablet  11/23/21   Other, MD Dipika   zinc gluconate 100 mg tab  11/23/21   Other, MD Dipika   Mucus Relief  mg ER tablet  11/23/21   Other, MD Dipika   cholecalciferol (VITAMIN D3) (1000 Units /25 mcg) tablet  11/23/21   Other, MD Dipika   Vitamin C 500 mg tablet  11/23/21   Other, MD Dipika   Arthritis Pain Relief 650 mg TbER  11/23/21   Other, MD Dipika   allopurinoL (ZYLOPRIM) 100 mg tablet Take 1 Tablet by mouth daily. Provider, Historical   furosemide (LASIX) 40 mg tablet Take 1 Tablet by mouth daily. Provider, Historical   levothyroxine (SYNTHROID) 25 mcg tablet Take 1 Tablet by mouth Daily (before breakfast). Provider, Historical   loratadine (CLARITIN) 10 mg tablet Take 10 mg by mouth. Provider, Historical   meloxicam (MOBIC) 7.5 mg tablet Take 1 Tablet by mouth daily. Provider, Historical   potassium chloride (K-DUR, KLOR-CON M20) 20 mEq tablet Take 1 Tablet by mouth daily. Provider, Historical   hydrOXYzine pamoate (VISTARIL) 25 mg capsule Take 25 mg by mouth two (2) times daily as needed. Provider, Historical   memantine (NAMENDA) 10 mg tablet Take 1 Tablet by mouth daily. Provider, Historical   melatonin 5 mg tablet Take 2 Tablets by mouth nightly. Provider, Historical   traZODone (DESYREL) 50 mg tablet Take 0.5 Tablets by mouth nightly. Provider, Historical      No Known Allergies  Social History     Tobacco Use    Smoking status: Never Smoker    Smokeless tobacco: Never Used   Vaping Use    Vaping Use: Never used   Substance Use Topics    Alcohol use: Not Currently    Drug use: Never            Review of Systems   Unable to perform ROS: Dementia          Objective:     Visit Vitals  BP (!) 100/52   Pulse 78   Temp 97 °F (36.1 °C)   Resp 18   SpO2 96%        General: alert, awake  Head: scalp normal, atraumatic  Chest/Lungs: clear breath sounds, no wheezing or crackles  Heart: normal rate, regular rhythm, no murmur  Abdomen: soft, non-distended, non-tender, normal bowel sounds, no organomegaly, no masses  Extremities: Immobilizer on left leg, no edema  Skin: no active skin lesions    Laboratory/Tests:  Admission on 12/21/2021, Discharged on 12/22/2021   Component Date Value Ref Range Status    WBC 12/21/2021 13.1  4.6 - 13.2 K/uL Final    RBC 12/21/2021 3.88* 4.20 - 5.30 M/uL Final    HGB 12/21/2021 11.7* 12.0 - 16.0 g/dL Final    HCT 12/21/2021 36.9  35.0 - 45.0 % Final    MCV 12/21/2021 95.1  78.0 - 100.0 FL Final    MCH 12/21/2021 30.2  24.0 - 34.0 PG Final    MCHC 12/21/2021 31.7  31.0 - 37.0 g/dL Final    RDW 12/21/2021 16.8* 11.6 - 14.5 % Final    PLATELET 83/71/4257 229  135 - 420 K/uL Final    MPV 12/21/2021 10.3  9.2 - 11.8 FL Final    NRBC 12/21/2021 0.0  0.0  WBC Final    ABSOLUTE NRBC 12/21/2021 0.00  0.00 - 0.01 K/uL Final    NEUTROPHILS 12/21/2021 76* 40 - 73 % Final    LYMPHOCYTES 12/21/2021 16* 21 - 52 % Final    MONOCYTES 12/21/2021 8  3 - 10 % Final    EOSINOPHILS 12/21/2021 0  0 - 5 % Final    BASOPHILS 12/21/2021 0  0 - 2 % Final    IMMATURE GRANULOCYTES 12/21/2021 0  0 - 0.5 % Final    ABS. NEUTROPHILS 12/21/2021 9.9* 1.8 - 8.0 K/UL Final    ABS. LYMPHOCYTES 12/21/2021 2.0  0.9 - 3.6 K/UL Final    ABS. MONOCYTES 12/21/2021 1.1  0.05 - 1.2 K/UL Final    ABS. EOSINOPHILS 12/21/2021 0.0  0.0 - 0.4 K/UL Final    ABS. BASOPHILS 12/21/2021 0.0  0.0 - 0.1 K/UL Final    ABS. IMM. GRANS. 12/21/2021 0.0  0.00 - 0.04 K/UL Final    DF 12/21/2021 AUTOMATED    Final    Sodium 12/21/2021 141  135 - 145 mmol/L Final    Potassium 12/21/2021 4.6  3.2 - 5.1 mmol/L Final    Chloride 12/21/2021 106  94 - 111 mmol/L Final    CO2 12/21/2021 26  21 - 33 mmol/L Final    Anion gap 12/21/2021 9  mmol/L Final    Glucose 12/21/2021 120* 70 - 110 mg/dL Final    BUN 12/21/2021 21  9 - 21 mg/dL Final    Creatinine 12/21/2021 0.90  0.70 - 1.20 mg/dL Final    BUN/Creatinine ratio 12/21/2021 23    Final    GFR est AA 12/21/2021 >60  ml/min/1.73m2 Final    GFR est non-AA 12/21/2021 58  ml/min/1.73m2 Final    Calcium 12/21/2021 10.5  8.5 - 10.5 mg/dL Final    Bilirubin, total 12/21/2021 0.6  0.2 - 1.0 mg/dL Final    AST (SGOT) 12/21/2021 21  14 - 74 U/L Final    ALT (SGPT) 12/21/2021 13  3 - 52 U/L Final    Alk.  phosphatase 12/21/2021 57  38 - 126 U/L Final    Protein, total 12/21/2021 7.2  6.1 - 8.4 g/dL Final    Albumin 12/21/2021 3.5  3.5 - 4.7 g/dL Final    Globulin 12/21/2021 3.7  g/dL Final    A-G Ratio 12/21/2021 0.9    Final    Ventricular Rate 12/21/2021 87  BPM Final    Atrial Rate 12/21/2021 88  BPM Final    P-R Interval 12/21/2021 55  ms Final    QRS Duration 12/21/2021 87  ms Final    Q-T Interval 12/21/2021 368  ms Final    QTC Calculation (Bezet) 12/21/2021 443  ms Final    Calculated P Axis 12/21/2021 57  degrees Final    Calculated R Axis 12/21/2021 53  degrees Final    Calculated T Axis 12/21/2021 57  degrees Final    Diagnosis 12/21/2021    Final                    Value:Sinus rhythm  Short TN interval  Low voltage, extremity leads    Confirmed by RAMIREZ MARIO ([id number]) on 12/22/2021 4:02:23 PM      Color 12/21/2021 Yellow    Final    Color Reference Range: Straw, Yellow or Dark Yellow    Appearance 12/21/2021 Cloudy    Final    Specific gravity 12/21/2021 1.013  1.005 - 1.030   Final    pH (UA) 12/21/2021 5.0  5.0 - 8.0   Final    Protein 12/21/2021 Negative  Negative mg/dL Final    Glucose 12/21/2021 Negative  Negative mg/dL Final    Ketone 12/21/2021 Negative  Negative mg/dL Final    Bilirubin 12/21/2021 Negative  Negative   Final    Blood 12/21/2021 Moderate* Negative   Final    Urobilinogen 12/21/2021 0.2  0.2 - 1.0 EU/dL Final    Nitrites 12/21/2021 Positive* Negative   Final    Leukocyte Esterase 12/21/2021 Large* Negative   Final    Troponin-I, Qt. 12/21/2021 <0.02* 0.02 - 0.05 ng/mL Final    WBC 12/21/2021 20-50  0 - 4 /hpf Final    RBC 12/21/2021 0-5  0 - 2 /hpf Final    Epithelial cells 12/21/2021 Few  0 - 20 /lpf Final    Epithelial cell category consists of squamous cells and/or transitional urothelial cells. Renal tubular cells, if present, are separately identified as such.     Bacteria 12/21/2021 3+* None /hpf Final    Hyaline cast 12/21/2021 0-2  /lpf Final    Prothrombin time 12/21/2021 14.8  11.5 - 15.2 sec Final    INR 12/21/2021 1.2  0.8 - 1.2   Final    Sodium 12/22/2021 143  135 - 145 mmol/L Final    Potassium 12/22/2021 4.6  3.2 - 5.1 mmol/L Final    Chloride 12/22/2021 108  94 - 111 mmol/L Final    CO2 12/22/2021 27  21 - 33 mmol/L Final    Anion gap 12/22/2021 8  mmol/L Final    Glucose 12/22/2021 108  70 - 110 mg/dL Final    BUN 12/22/2021 21  9 - 21 mg/dL Final    Creatinine 12/22/2021 1.00  0.70 - 1.20 mg/dL Final    BUN/Creatinine ratio 12/22/2021 21    Final    GFR est AA 12/22/2021 >60  ml/min/1.73m2 Final    GFR est non-AA 12/22/2021 51  ml/min/1.73m2 Final    Calcium 12/22/2021 10.9* 8.5 - 10.5 mg/dL Final    WBC 12/22/2021 12.7  4.6 - 13.2 K/uL Final    RBC 12/22/2021 3.93* 4.20 - 5.30 M/uL Final    HGB 12/22/2021 11.7* 12.0 - 16.0 g/dL Final    HCT 12/22/2021 37.3  35.0 - 45.0 % Final    MCV 12/22/2021 94.9  78.0 - 100.0 FL Final  MCH 12/22/2021 29.8  24.0 - 34.0 PG Final    MCHC 12/22/2021 31.4  31.0 - 37.0 g/dL Final    RDW 12/22/2021 16.8* 11.6 - 14.5 % Final    PLATELET 70/38/5464 969  135 - 420 K/uL Final    MPV 12/22/2021 10.1  9.2 - 11.8 FL Final    NRBC 12/22/2021 0.0  0.0  WBC Final    ABSOLUTE NRBC 12/22/2021 0.00  0.00 - 0.01 K/uL Final    NEUTROPHILS 12/22/2021 71  40 - 73 % Final    LYMPHOCYTES 12/22/2021 18* 21 - 52 % Final    MONOCYTES 12/22/2021 11* 3 - 10 % Final    EOSINOPHILS 12/22/2021 0  0 - 5 % Final    BASOPHILS 12/22/2021 0  0 - 2 % Final    IMMATURE GRANULOCYTES 12/22/2021 0  0 - 0.5 % Final    ABS. NEUTROPHILS 12/22/2021 8.9* 1.8 - 8.0 K/UL Final    ABS. LYMPHOCYTES 12/22/2021 2.3  0.9 - 3.6 K/UL Final    ABS. MONOCYTES 12/22/2021 1.3* 0.05 - 1.2 K/UL Final    ABS. EOSINOPHILS 12/22/2021 0.0  0.0 - 0.4 K/UL Final    ABS. BASOPHILS 12/22/2021 0.1  0.0 - 0.1 K/UL Final    ABS. IMM. GRANS. 12/22/2021 0.1* 0.00 - 0.04 K/UL Final    DF 12/22/2021 AUTOMATED    Final    TSH 12/22/2021 2.07  0.35 - 6.20 uIU/mL Final    Comment:    Due to TSH heterogeneity, both structurally and degree of glycosylation, monoclonal antibodies used in the TSH assay may not accurately quantitate TSH. Therefore, this result should be correlated with clinical findings as well as with other assessments of thyroid function, e.g., free T4, free T3.  Special Requests: 12/22/2021 No Special Requests    Final    [place] Count 12/22/2021     Final                    Value:>100,000  colonies/ml      Culture result: 12/22/2021     Final                    Value:>2 organisms - contaminated specimen.  suggest recollection  [place] [facility] Visit on 12/14/2021   Component Date Value Ref Range Status    Sodium 12/14/2021 139  135 - 145 mmol/L Final    Potassium 12/14/2021 4.7  3.2 - 5.1 mmol/L Final    Chloride 12/14/2021 105  94 - 111 mmol/L Final    CO2 12/14/2021 23  21 - 33 mmol/L Final    Anion gap 12/14/2021 11  mmol/L Final    Glucose 12/14/2021 82  70 - 110 mg/dL Final    BUN 12/14/2021 34* 9 - 21 mg/dL Final    Creatinine 12/14/2021 1.20  0.70 - 1.20 mg/dL Final    BUN/Creatinine ratio 12/14/2021 28    Final    GFR est AA 12/14/2021 50  ml/min/1.73m2 Final    GFR est non-AA 12/14/2021 41  ml/min/1.73m2 Final    Comment: Estimated GFR is calculated using the IDMS-traceable Modification of Diet in Renal Disease (MDRD) Study equation, reported for both  Americans (GFRAA) and non- Americans (GFRNA), and normalized to 1.73m2 body surface area. The physician must decide which value applies to the patient. The MDRD study equation should only be used in individuals age 25 or older. It has not been validated for the following: pregnant women, patients with serious comorbid conditions, or on certain medications, or persons with extremes of body size, muscle mass, or nutritional status.  Calcium 12/14/2021 10.5  8.5 - 10.5 mg/dL Final   Hospital Outpatient Visit on 06/04/2021   Component Date Value Ref Range Status    Sodium 06/04/2021 141  135 - 145 mmol/L Final    Potassium 06/04/2021 4.5  3.2 - 5.1 mmol/L Final    Chloride 06/04/2021 109  94 - 111 mmol/L Final    CO2 06/04/2021 25  21 - 33 mmol/L Final    Anion gap 06/04/2021 7  mmol/L Final    Glucose 06/04/2021 85  70 - 110 mg/dL Final    BUN 06/04/2021 27* 9 - 21 mg/dL Final    Creatinine 06/04/2021 0.80  0.70 - 1.20 mg/dL Final    BUN/Creatinine ratio 06/04/2021 34    Final    GFR est AA 06/04/2021 >60  ml/min/1.73m2 Final    GFR est non-AA 06/04/2021 >60  ml/min/1.73m2 Final    Comment: Estimated GFR is calculated using the IDMS-traceable Modification of Diet in Renal Disease (MDRD) Study equation, reported for both  Americans (GFRAA) and non- Americans (GFRNA), and normalized to 1.73m2 body surface area. The physician must decide which value applies to the patient.   The MDRD study equation should only be used in individuals age 25 or older. It has not been validated for the following: pregnant women, patients with serious comorbid conditions, or on certain medications, or persons with extremes of body size, muscle mass, or nutritional status.  Calcium 06/04/2021 10.0  8.5 - 10.5 mg/dL Final    WBC 06/04/2021 4.1* 4.6 - 13.2 K/uL Final    RBC 06/04/2021 4.04* 4.20 - 5.30 M/uL Final    HGB 06/04/2021 12.0  12.0 - 16.0 g/dL Final    HCT 06/04/2021 38.9  35.0 - 45.0 % Final    MCV 06/04/2021 96.3  74.0 - 97.0 FL Final    MCH 06/04/2021 29.7  24.0 - 34.0 PG Final    MCHC 06/04/2021 30.8* 31.0 - 37.0 g/dL Final    RDW 06/04/2021 16.1* 11.6 - 14.5 % Final    PLATELET 38/92/7481 093  135 - 420 K/uL Final    MPV 06/04/2021 10.0  9.2 - 11.8 FL Final    NEUTROPHILS 06/04/2021 41  40 - 73 % Final    LYMPHOCYTES 06/04/2021 44  21 - 52 % Final    MONOCYTES 06/04/2021 14* 3 - 10 % Final    EOSINOPHILS 06/04/2021 0  0 - 5 % Final    BASOPHILS 06/04/2021 1  0 - 2 % Final    IMMATURE GRANULOCYTES 06/04/2021 0  % Final    ABS. NEUTROPHILS 06/04/2021 1.7* 1.8 - 8.0 K/UL Final    ABS. LYMPHOCYTES 06/04/2021 1.9  0.9 - 3.6 K/UL Final    ABS. MONOCYTES 06/04/2021 0.6  0.05 - 1.2 K/UL Final    ABS. EOSINOPHILS 06/04/2021 0.0  0.0 - 0.4 K/UL Final    ABS. BASOPHILS 06/04/2021 0.0  0.0 - 0.1 K/UL Final    ABS. IMM. GRANS. 06/04/2021 0.0  K/UL Final    Uric acid 06/04/2021 5.5  3.5 - 8.5 mg/dL Final   Hospital Outpatient Visit on 02/19/2021   Component Date Value Ref Range Status    Special Requests: 02/18/2021 No Special Requests    Final    [place] Count 02/18/2021     Final                    Value:>100,000  colonies/ml      Culture result: 02/18/2021 Enterococcus faecalis*   Final         Assessment & Plan:     1.  Closed nondisplaced supracondylar fracture of distal end of left femur without intracondylar extension with routine healing, subsequent encounter  No surgical intervention per ortho. Continue Lovenox for DVT prophylaxis for 21 days. Repeat x-ray in 2 weeks per ortho. Currently on immobilizer with strict non weightbearing. 2. Essential hypertension  Bps are low. Will discontinue lasix. 3. Late onset Alzheimer's disease without behavioral disturbance ([facility] [address]  Continue Namenda    4. Mood disorder ([facility]  Continue trazodone          I have discussed the diagnosis with the patient and the intended plan as seen in the above orders. The patient has received an after-visit summary and questions were answered concerning future plans. I have discussed medication side effects and warnings with the patient as well. I have reviewed the plan of care with the patient, accepted their input and they are in agreement with the treatment goals. Previous lab and imaging results were reviewed by me.        Evaristo Roberts MD  December 26, 2021

## 2021-12-28 ENCOUNTER — TRANSCRIBE ORDER (OUTPATIENT)
Dept: REGISTRATION | Age: 86
End: 2021-12-28

## 2021-12-28 DIAGNOSIS — S72.402A: Primary | ICD-10-CM

## 2022-01-01 DIAGNOSIS — S72.402A: ICD-10-CM

## 2022-01-03 ENCOUNTER — HOSPITAL ENCOUNTER (OUTPATIENT)
Dept: GENERAL RADIOLOGY | Age: 87
Discharge: HOME | End: 2022-01-03
Attending: ORTHOPAEDIC SURGERY
Payer: MEDICARE

## 2022-01-03 DIAGNOSIS — S72.402A: ICD-10-CM

## 2022-01-03 PROCEDURE — 73560 X-RAY EXAM OF KNEE 1 OR 2: CPT

## 2022-02-24 ENCOUNTER — OFFICE VISIT (OUTPATIENT)
Dept: FAMILY MEDICINE CLINIC | Age: 87
End: 2022-02-24
Payer: MEDICARE

## 2022-02-24 DIAGNOSIS — F02.80 LATE ONSET ALZHEIMER'S DISEASE WITHOUT BEHAVIORAL DISTURBANCE (HCC): ICD-10-CM

## 2022-02-24 DIAGNOSIS — E03.9 ACQUIRED HYPOTHYROIDISM: ICD-10-CM

## 2022-02-24 DIAGNOSIS — M1A.0710 IDIOPATHIC CHRONIC GOUT OF RIGHT ANKLE WITHOUT TOPHUS: ICD-10-CM

## 2022-02-24 DIAGNOSIS — S72.455D CLOSED NONDISPLACED SUPRACONDYLAR FRACTURE OF DISTAL END OF LEFT FEMUR WITHOUT INTRACONDYLAR EXTENSION WITH ROUTINE HEALING, SUBSEQUENT ENCOUNTER: Primary | ICD-10-CM

## 2022-02-24 DIAGNOSIS — G30.1 LATE ONSET ALZHEIMER'S DISEASE WITHOUT BEHAVIORAL DISTURBANCE (HCC): ICD-10-CM

## 2022-02-24 PROCEDURE — 99309 SBSQ NF CARE MODERATE MDM 30: CPT | Performed by: NURSE PRACTITIONER

## 2022-02-24 PROCEDURE — 1101F PT FALLS ASSESS-DOCD LE1/YR: CPT | Performed by: NURSE PRACTITIONER

## 2022-02-24 PROCEDURE — G8536 NO DOC ELDER MAL SCRN: HCPCS | Performed by: NURSE PRACTITIONER

## 2022-02-24 PROCEDURE — G8432 DEP SCR NOT DOC, RNG: HCPCS | Performed by: NURSE PRACTITIONER

## 2022-02-27 VITALS
SYSTOLIC BLOOD PRESSURE: 114 MMHG | TEMPERATURE: 97.3 F | RESPIRATION RATE: 18 BRPM | DIASTOLIC BLOOD PRESSURE: 60 MMHG | OXYGEN SATURATION: 97 % | HEART RATE: 74 BPM

## 2022-02-27 NOTE — PROGRESS NOTES
History of Present Illness  Omar Koo is a 80 y.o. female who presents today for: Follow-up. Past Medical History  Past Medical History:   Diagnosis Date    Bradycardia     Dementia ([address]     Gout     Hypertension     Hypoglycemia     OA (osteoarthritis)         Surgical History  No past surgical history on file. Current Medications  Current Outpatient Medications   Medication Sig    enoxaparin (Lovenox) 30 mg/0.3 mL injection 0.3 mL by SubCUTAneous route daily.  aspirin 81 mg chewable tablet     zinc gluconate 100 mg tab     Mucus Relief  mg ER tablet     cholecalciferol (VITAMIN D3) (1000 Units /25 mcg) tablet     Vitamin C 500 mg tablet     Arthritis Pain Relief 650 mg TbER     allopurinoL (ZYLOPRIM) 100 mg tablet Take 1 Tablet by mouth daily.  levothyroxine (SYNTHROID) 25 mcg tablet Take 1 Tablet by mouth Daily (before breakfast).  loratadine (CLARITIN) 10 mg tablet Take 10 mg by mouth.  meloxicam (MOBIC) 7.5 mg tablet Take 1 Tablet by mouth daily.  potassium chloride (K-DUR, KLOR-CON M20) 20 mEq tablet Take 1 Tablet by mouth daily.  hydrOXYzine pamoate (VISTARIL) 25 mg capsule Take 25 mg by mouth two (2) times daily as needed.  memantine (NAMENDA) 10 mg tablet Take 1 Tablet by mouth daily.  melatonin 5 mg tablet Take 2 Tablets by mouth nightly.  traZODone (DESYREL) 50 mg tablet Take 0.5 Tablets by mouth nightly. No current facility-administered medications for this visit. Allergies/Drug Reactions  No Known Allergies     Family History  No family history on file.      Social History  Social History     Tobacco Use    Smoking status: Never Smoker    Smokeless tobacco: Never Used   Vaping Use    Vaping Use: Never used   Substance Use Topics    Alcohol use: Not Currently    Drug use: Never        Health Maintenance   Topic Date Due    Depression Screen  Never done    DTaP/Tdap/Td series (1 - Tdap) Never done    Shingrix Vaccine Age 50> (1 of 2) Never done    Bone Densitometry (Dexa) Screening  Never done    Medicare Yearly Exam  Never done    Flu Vaccine  Completed    COVID-19 Vaccine  Completed    Pneumococcal 65+ years  Completed     Immunization History   Administered Date(s) Administered    COVID-19, Pfizer Purple top, DILUTE for use, 12+ yrs, 30mcg/0.3mL dose 12/16/2020, 02/04/2021, 10/27/2021    Influenza Vaccine 09/08/2021    Pneumococcal Conjugate (PCV-13) 10/22/2018    Pneumococcal Vaccine (Unspecified Type) 11/08/2019     Physical Exam  Vital signs:   Vitals:    02/24/22 1317   BP: 114/60   Pulse: 74   Resp: 18   Temp: 97.3 °F (36.3 °C)   SpO2: 97%     General: alert, oriented, not in distress  Head: scalp normal, atraumatic  Eyes: pupils are equal and reactive, full and intact EOM's  Ears: patent ear canal, intact tympanic membrane  Nose: normal turbinates, no congestion or discharge  Lips/Mouth: moist lips and buccal mucosa, non-enlarged tonsils, pink throat  Neck: supple, no JVD, no lymphadenopathy, non-palpable thyroid  Chest/Lungs: clear breath sounds, no wheezing or crackles  Heart: normal rate, regular rhythm, no murmur  Abdomen: soft, non-distended, non-tender, normal bowel sounds, no organomegaly, no masses  Extremities: no focal deformities, no edema  Skin: no active skin lesions    Laboratory/Tests:  Admission on 12/21/2021, Discharged on 12/22/2021   Component Date Value Ref Range Status    WBC 12/21/2021 13.1  4.6 - 13.2 K/uL Final    RBC 12/21/2021 3.88* 4.20 - 5.30 M/uL Final    HGB 12/21/2021 11.7* 12.0 - 16.0 g/dL Final    HCT 12/21/2021 36.9  35.0 - 45.0 % Final    MCV 12/21/2021 95.1  78.0 - 100.0 FL Final    MCH 12/21/2021 30.2  24.0 - 34.0 PG Final    MCHC 12/21/2021 31.7  31.0 - 37.0 g/dL Final    RDW 12/21/2021 16.8* 11.6 - 14.5 % Final    PLATELET 48/53/7649 594  135 - 420 K/uL Final    MPV 12/21/2021 10.3  9.2 - 11.8 FL Final    NRBC 12/21/2021 0.0  0.0  WBC Final    ABSOLUTE NRBC 12/21/2021 0.00  0.00 - 0.01 K/uL Final    NEUTROPHILS 12/21/2021 76* 40 - 73 % Final    LYMPHOCYTES 12/21/2021 16* 21 - 52 % Final    MONOCYTES 12/21/2021 8  3 - 10 % Final    EOSINOPHILS 12/21/2021 0  0 - 5 % Final    BASOPHILS 12/21/2021 0  0 - 2 % Final    IMMATURE GRANULOCYTES 12/21/2021 0  0 - 0.5 % Final    ABS. NEUTROPHILS 12/21/2021 9.9* 1.8 - 8.0 K/UL Final    ABS. LYMPHOCYTES 12/21/2021 2.0  0.9 - 3.6 K/UL Final    ABS. MONOCYTES 12/21/2021 1.1  0.05 - 1.2 K/UL Final    ABS. EOSINOPHILS 12/21/2021 0.0  0.0 - 0.4 K/UL Final    ABS. BASOPHILS 12/21/2021 0.0  0.0 - 0.1 K/UL Final    ABS. IMM. GRANS. 12/21/2021 0.0  0.00 - 0.04 K/UL Final    DF 12/21/2021 AUTOMATED    Final    Sodium 12/21/2021 141  135 - 145 mmol/L Final    Potassium 12/21/2021 4.6  3.2 - 5.1 mmol/L Final    Chloride 12/21/2021 106  94 - 111 mmol/L Final    CO2 12/21/2021 26  21 - 33 mmol/L Final    Anion gap 12/21/2021 9  mmol/L Final    Glucose 12/21/2021 120* 70 - 110 mg/dL Final    BUN 12/21/2021 21  9 - 21 mg/dL Final    Creatinine 12/21/2021 0.90  0.70 - 1.20 mg/dL Final    BUN/Creatinine ratio 12/21/2021 23    Final    GFR est AA 12/21/2021 >60  ml/min/1.73m2 Final    GFR est non-AA 12/21/2021 58  ml/min/1.73m2 Final    Calcium 12/21/2021 10.5  8.5 - 10.5 mg/dL Final    Bilirubin, total 12/21/2021 0.6  0.2 - 1.0 mg/dL Final    AST (SGOT) 12/21/2021 21  14 - 74 U/L Final    ALT (SGPT) 12/21/2021 13  3 - 52 U/L Final    Alk.  phosphatase 12/21/2021 57  38 - 126 U/L Final    Protein, total 12/21/2021 7.2  6.1 - 8.4 g/dL Final    Albumin 12/21/2021 3.5  3.5 - 4.7 g/dL Final    Globulin 12/21/2021 3.7  g/dL Final    A-G Ratio 12/21/2021 0.9    Final    Ventricular Rate 12/21/2021 87  BPM Final    Atrial Rate 12/21/2021 88  BPM Final    P-R Interval 12/21/2021 55  ms Final    QRS Duration 12/21/2021 87  ms Final    Q-T Interval 12/21/2021 368  ms Final    QTC Calculation (Bezet) 12/21/2021 443  ms Final  Calculated P Axis 12/21/2021 57  degrees Final    Calculated R Axis 12/21/2021 53  degrees Final    Calculated T Axis 12/21/2021 57  degrees Final    Diagnosis 12/21/2021    Final                    Value:Sinus rhythm  Short AL interval  Low voltage, extremity leads    Confirmed by RAMIREZ Murphy ([id number]) on 12/22/2021 4:02:23 PM      Color 12/21/2021 Yellow    Final    Color Reference Range: Straw, Yellow or Dark Yellow    Appearance 12/21/2021 Cloudy    Final    Specific gravity 12/21/2021 1.013  1.005 - 1.030   Final    pH (UA) 12/21/2021 5.0  5.0 - 8.0   Final    Protein 12/21/2021 Negative  Negative mg/dL Final    Glucose 12/21/2021 Negative  Negative mg/dL Final    Ketone 12/21/2021 Negative  Negative mg/dL Final    Bilirubin 12/21/2021 Negative  Negative   Final    Blood 12/21/2021 Moderate* Negative   Final    Urobilinogen 12/21/2021 0.2  0.2 - 1.0 EU/dL Final    Nitrites 12/21/2021 Positive* Negative   Final    Leukocyte Esterase 12/21/2021 Large* Negative   Final    Troponin-I, Qt. 12/21/2021 <0.02* 0.02 - 0.05 ng/mL Final    WBC 12/21/2021 20-50  0 - 4 /hpf Final    RBC 12/21/2021 0-5  0 - 2 /hpf Final    Epithelial cells 12/21/2021 Few  0 - 20 /lpf Final    Epithelial cell category consists of squamous cells and/or transitional urothelial cells. Renal tubular cells, if present, are separately identified as such.     Bacteria 12/21/2021 3+* None /hpf Final    Hyaline cast 12/21/2021 0-2  /lpf Final    Prothrombin time 12/21/2021 14.8  11.5 - 15.2 sec Final    INR 12/21/2021 1.2  0.8 - 1.2   Final    Sodium 12/22/2021 143  135 - 145 mmol/L Final    Potassium 12/22/2021 4.6  3.2 - 5.1 mmol/L Final    Chloride 12/22/2021 108  94 - 111 mmol/L Final    CO2 12/22/2021 27  21 - 33 mmol/L Final    Anion gap 12/22/2021 8  mmol/L Final    Glucose 12/22/2021 108  70 - 110 mg/dL Final    BUN 12/22/2021 21  9 - 21 mg/dL Final    Creatinine 12/22/2021 1.00  0.70 - 1.20 mg/dL Final    BUN/Creatinine ratio 12/22/2021 21    Final    GFR est AA 12/22/2021 >60  ml/min/1.73m2 Final    GFR est non-AA 12/22/2021 51  ml/min/1.73m2 Final    Calcium 12/22/2021 10.9* 8.5 - 10.5 mg/dL Final    WBC 12/22/2021 12.7  4.6 - 13.2 K/uL Final    RBC 12/22/2021 3.93* 4.20 - 5.30 M/uL Final    HGB 12/22/2021 11.7* 12.0 - 16.0 g/dL Final    HCT 12/22/2021 37.3  35.0 - 45.0 % Final    MCV 12/22/2021 94.9  78.0 - 100.0 FL Final    MCH 12/22/2021 29.8  24.0 - 34.0 PG Final    MCHC 12/22/2021 31.4  31.0 - 37.0 g/dL Final    RDW 12/22/2021 16.8* 11.6 - 14.5 % Final    PLATELET 75/96/1230 934  135 - 420 K/uL Final    MPV 12/22/2021 10.1  9.2 - 11.8 FL Final    NRBC 12/22/2021 0.0  0.0  WBC Final    ABSOLUTE NRBC 12/22/2021 0.00  0.00 - 0.01 K/uL Final    NEUTROPHILS 12/22/2021 71  40 - 73 % Final    LYMPHOCYTES 12/22/2021 18* 21 - 52 % Final    MONOCYTES 12/22/2021 11* 3 - 10 % Final    EOSINOPHILS 12/22/2021 0  0 - 5 % Final    BASOPHILS 12/22/2021 0  0 - 2 % Final    IMMATURE GRANULOCYTES 12/22/2021 0  0 - 0.5 % Final    ABS. NEUTROPHILS 12/22/2021 8.9* 1.8 - 8.0 K/UL Final    ABS. LYMPHOCYTES 12/22/2021 2.3  0.9 - 3.6 K/UL Final    ABS. MONOCYTES 12/22/2021 1.3* 0.05 - 1.2 K/UL Final    ABS. EOSINOPHILS 12/22/2021 0.0  0.0 - 0.4 K/UL Final    ABS. BASOPHILS 12/22/2021 0.1  0.0 - 0.1 K/UL Final    ABS. IMM. GRANS. 12/22/2021 0.1* 0.00 - 0.04 K/UL Final    DF 12/22/2021 AUTOMATED    Final    TSH 12/22/2021 2.07  0.35 - 6.20 uIU/mL Final    Comment:    Due to TSH heterogeneity, both structurally and degree of glycosylation, monoclonal antibodies used in the TSH assay may not accurately quantitate TSH. Therefore, this result should be correlated with clinical findings as well as with other assessments of thyroid function, e.g., free T4, free T3.        Special Requests: 12/22/2021 No Special Requests    Final    [place] Count 12/22/2021     Final Value:>100,000  colonies/ml      Culture result: 12/22/2021     Final                    Value:>2 organisms - contaminated specimen. suggest recollection  [facility] Visit on 12/14/2021   Component Date Value Ref Range Status    Sodium 12/14/2021 139  135 - 145 mmol/L Final    Potassium 12/14/2021 4.7  3.2 - 5.1 mmol/L Final    Chloride 12/14/2021 105  94 - 111 mmol/L Final    CO2 12/14/2021 23  21 - 33 mmol/L Final    Anion gap 12/14/2021 11  mmol/L Final    Glucose 12/14/2021 82  70 - 110 mg/dL Final    BUN 12/14/2021 34* 9 - 21 mg/dL Final    Creatinine 12/14/2021 1.20  0.70 - 1.20 mg/dL Final    BUN/Creatinine ratio 12/14/2021 28    Final    GFR est AA 12/14/2021 50  ml/min/1.73m2 Final    GFR est non-AA 12/14/2021 41  ml/min/1.73m2 Final    Comment: Estimated GFR is calculated using the IDMS-traceable Modification of Diet in Renal Disease (MDRD) Study equation, reported for both  Americans (GFRAA) and non- Americans (GFRNA), and normalized to 1.73m2 body surface area. The physician must decide which value applies to the patient. The MDRD study equation should only be used in individuals age 25 or older. It has not been validated for the following: pregnant women, patients with serious comorbid conditions, or on certain medications, or persons with extremes of body size, muscle mass, or nutritional status.  Calcium 12/14/2021 10.5  8.5 - 10.5 mg/dL Final     Patient is a 49-year-old female resident of [address] [place] [address]. Patient sustained a ground-level fall which on 12/21/2021. As a result of ground-level fall patient sustained left femur fracture. No surgical intervention was recommended by orthopedist and patient is currently wearing left leg immobilizer. Patient has history of Alzheimer's dementia. She is alert and oriented to self and situation.   Patient currently has no complaints of pain or discomfort at this time. Nursing staff reports no behavioral disturbance or abnormality. The patient is incontinent of bowel and bladder. Assessment/Plan:    1. Left femur fracture. Continue nonweightbearing status of left leg and patient is currently followed by orthopedist, Dr. Samia Faye for evaluation of left femur fracture. 2.  Alzheimer's disease. Continue Memantine 10 mg tablet daily for management of Alzheimer's disease. 3.  Acquired hypothyroidism. Continue Levothyroxine 25 mcg tablet daily before breakfast for management of acquired hypothyroidism. 4.  2.  Continue Allopurinol 100 mg tablet daily for management of gout. I have discussed the diagnosis with the patient and the intended plan as seen in the above orders. The patient has received an after-visit summary and questions were answered concerning future plans. I have discussed medication side effects and warnings with the patient as well. I have reviewed the plan of care with the patient, accepted their input and they are in agreement with the treatment goals.        Patel Hall NP  February 27, 2022

## 2022-03-18 PROBLEM — F02.80 LATE ONSET ALZHEIMER'S DISEASE WITHOUT BEHAVIORAL DISTURBANCE (HCC): Status: ACTIVE | Noted: 2020-08-11

## 2022-03-18 PROBLEM — G30.1 LATE ONSET ALZHEIMER'S DISEASE WITHOUT BEHAVIORAL DISTURBANCE (HCC): Status: ACTIVE | Noted: 2020-08-11

## 2022-03-18 PROBLEM — I10 ESSENTIAL HYPERTENSION: Status: ACTIVE | Noted: 2020-08-11

## 2022-03-18 PROBLEM — E16.2 HYPOGLYCEMIA: Status: ACTIVE | Noted: 2020-08-11

## 2022-03-19 PROBLEM — M1A.0710 IDIOPATHIC CHRONIC GOUT OF RIGHT ANKLE WITHOUT TOPHUS: Status: ACTIVE | Noted: 2020-08-11

## 2022-03-19 PROBLEM — E03.8 OTHER SPECIFIED HYPOTHYROIDISM: Status: ACTIVE | Noted: 2020-08-11

## 2022-03-19 PROBLEM — S72.92XA FEMUR FRACTURE, LEFT (HCC): Status: ACTIVE | Noted: 2021-12-21

## 2022-03-19 PROBLEM — R00.1 BRADYCARDIA: Status: ACTIVE | Noted: 2020-08-11

## 2022-03-19 PROBLEM — M15.9 PRIMARY OSTEOARTHRITIS INVOLVING MULTIPLE JOINTS: Status: ACTIVE | Noted: 2020-08-11

## 2022-03-19 PROBLEM — N39.0 UTI (URINARY TRACT INFECTION): Status: ACTIVE | Noted: 2021-12-21

## 2022-03-19 PROBLEM — M15.0 PRIMARY OSTEOARTHRITIS INVOLVING MULTIPLE JOINTS: Status: ACTIVE | Noted: 2020-08-11

## 2022-05-03 ENCOUNTER — OFFICE VISIT (OUTPATIENT)
Dept: FAMILY MEDICINE CLINIC | Age: 87
End: 2022-05-03
Payer: MEDICARE

## 2022-05-03 DIAGNOSIS — F39 MOOD DISORDER (HCC): Primary | ICD-10-CM

## 2022-05-03 DIAGNOSIS — G30.1 LATE ONSET ALZHEIMER'S DISEASE WITHOUT BEHAVIORAL DISTURBANCE (HCC): ICD-10-CM

## 2022-05-03 DIAGNOSIS — F02.80 LATE ONSET ALZHEIMER'S DISEASE WITHOUT BEHAVIORAL DISTURBANCE (HCC): ICD-10-CM

## 2022-05-03 DIAGNOSIS — E03.9 ACQUIRED HYPOTHYROIDISM: ICD-10-CM

## 2022-05-03 PROCEDURE — 99309 SBSQ NF CARE MODERATE MDM 30: CPT | Performed by: STUDENT IN AN ORGANIZED HEALTH CARE EDUCATION/TRAINING PROGRAM

## 2022-05-03 PROCEDURE — G8536 NO DOC ELDER MAL SCRN: HCPCS | Performed by: STUDENT IN AN ORGANIZED HEALTH CARE EDUCATION/TRAINING PROGRAM

## 2022-05-03 PROCEDURE — G8432 DEP SCR NOT DOC, RNG: HCPCS | Performed by: STUDENT IN AN ORGANIZED HEALTH CARE EDUCATION/TRAINING PROGRAM

## 2022-05-03 PROCEDURE — 1101F PT FALLS ASSESS-DOCD LE1/YR: CPT | Performed by: STUDENT IN AN ORGANIZED HEALTH CARE EDUCATION/TRAINING PROGRAM

## 2022-05-09 PROBLEM — F39 MOOD DISORDER (HCC): Status: ACTIVE | Noted: 2022-05-09

## 2022-05-09 NOTE — PROGRESS NOTES
Subjective:   Dago Correa is a Joe Gely [age] y.o. female who was seen for routine follow up at [address]. No concerns per nursing staff. Home Medications    Medication Sig Start Date End Date Taking? Authorizing Provider   enoxaparin (Lovenox) 30 mg/0.3 mL injection 0.3 mL by SubCUTAneous route daily. 21   Azar Johansen MD   aspirin 81 mg chewable tablet  21   Other, MD Dipika   zinc gluconate 100 mg tab  21   Other, MD Dipika   Mucus Relief  mg ER tablet  21   Other, MD Dipika   cholecalciferol (VITAMIN D3) (1000 Units /25 mcg) tablet  21   Other, MD Dipika   Vitamin C 500 mg tablet  21   Other, MD Dipika   Arthritis Pain Relief 650 mg TbER  21   Other, MD Dipika   allopurinoL (ZYLOPRIM) 100 mg tablet Take 1 Tablet by mouth daily. Provider, Historical   levothyroxine (SYNTHROID) 25 mcg tablet Take 1 Tablet by mouth Daily (before breakfast). Provider, Historical   loratadine (CLARITIN) 10 mg tablet Take 10 mg by mouth. Provider, Historical   meloxicam (MOBIC) 7.5 mg tablet Take 1 Tablet by mouth daily. Provider, Historical   potassium chloride (K-DUR, KLOR-CON M20) 20 mEq tablet Take 1 Tablet by mouth daily. Provider, Historical   hydrOXYzine pamoate (VISTARIL) 25 mg capsule Take 25 mg by mouth two (2) times daily as needed. Provider, Historical   memantine (NAMENDA) 10 mg tablet Take 1 Tablet by mouth daily. Provider, Historical   melatonin 5 mg tablet Take 2 Tablets by mouth nightly. Provider, Historical   traZODone (DESYREL) 50 mg tablet Take 0.5 Tablets by mouth nightly. Provider, Historical      No Known Allergies  Social History     Tobacco Use    Smoking status: Never Smoker    Smokeless tobacco: Never Used   Vaping Use    Vaping Use: Never used   Substance Use Topics    Alcohol use: Not Currently    Drug use: Never        Review of Systems   All other systems reviewed and are negative.       Objective: There were no vitals taken for this visit. General: alert, awake  Head: scalp normal, atraumatic  Eyes: pupils are equal and reactive, full and intact EOM's  Ears: patent ear canal, intact tympanic membrane  Nose: normal turbinates, no congestion or discharge  Lips/Mouth: moist lips and buccal mucosa, non-enlarged tonsils, pink throat  Neck: supple, no JVD, no lymphadenopathy, non-palpable thyroid  Chest/Lungs: clear breath sounds, no wheezing or crackles  Heart: normal rate, regular rhythm, no murmur  Abdomen: soft, non-distended, non-tender, normal bowel sounds, no organomegaly, no masses  Extremities: no focal deformities, no edema  Skin: no active skin lesions      Assessment & Plan:     1. Late onset Alzheimer's disease without behavioral disturbance (HCC)  Continue namenda    2. Mood disorder (HCC)  Continue trazodone, vistaril PRN     3. Acquired hypothyroidism  Continue synthroid             I have discussed the diagnosis with the patient and the intended plan as seen in the above orders. The patient has received an after-visit summary and questions were answered concerning future plans. I have discussed medication side effects and warnings with the patient as well. I have reviewed the plan of care with the patient, accepted their input and they are in agreement with the treatment goals. Previous lab and imaging results were reviewed by me.        Devante Westbrook MD  May 9, 2022

## 2022-07-07 ENCOUNTER — OFFICE VISIT (OUTPATIENT)
Dept: FAMILY MEDICINE CLINIC | Age: 87
End: 2022-07-07
Payer: MEDICARE

## 2022-07-07 DIAGNOSIS — G47.00 INSOMNIA, UNSPECIFIED TYPE: ICD-10-CM

## 2022-07-07 DIAGNOSIS — G30.1 LATE ONSET ALZHEIMER'S DISEASE WITHOUT BEHAVIORAL DISTURBANCE (HCC): ICD-10-CM

## 2022-07-07 DIAGNOSIS — E03.9 ACQUIRED HYPOTHYROIDISM: Primary | ICD-10-CM

## 2022-07-07 DIAGNOSIS — F02.80 LATE ONSET ALZHEIMER'S DISEASE WITHOUT BEHAVIORAL DISTURBANCE (HCC): ICD-10-CM

## 2022-07-07 PROCEDURE — 1101F PT FALLS ASSESS-DOCD LE1/YR: CPT | Performed by: NURSE PRACTITIONER

## 2022-07-07 PROCEDURE — G8536 NO DOC ELDER MAL SCRN: HCPCS | Performed by: NURSE PRACTITIONER

## 2022-07-07 PROCEDURE — 1123F ACP DISCUSS/DSCN MKR DOCD: CPT | Performed by: NURSE PRACTITIONER

## 2022-07-07 PROCEDURE — G8432 DEP SCR NOT DOC, RNG: HCPCS | Performed by: NURSE PRACTITIONER

## 2022-07-07 PROCEDURE — 99309 SBSQ NF CARE MODERATE MDM 30: CPT | Performed by: NURSE PRACTITIONER

## 2022-07-07 NOTE — PROGRESS NOTES
History of Present Illness  Anitha Carpenter is a 80 y.o. female who presents today for: Follow-up. Past Medical History  Past Medical History:   Diagnosis Date    Bradycardia     Dementia ([address]     Gout     Hypertension     Hypoglycemia     OA (osteoarthritis)         Surgical History  No past surgical history on file. Current Medications  Current Outpatient Medications   Medication Sig    aspirin 81 mg chewable tablet     zinc gluconate 100 mg tab     Mucus Relief  mg ER tablet     cholecalciferol (VITAMIN D3) (1000 Units /25 mcg) tablet     Vitamin C 500 mg tablet     Arthritis Pain Relief 650 mg TbER     allopurinoL (ZYLOPRIM) 100 mg tablet Take 1 Tablet by mouth daily.  levothyroxine (SYNTHROID) 25 mcg tablet Take 1 Tablet by mouth Daily (before breakfast).  loratadine (CLARITIN) 10 mg tablet Take 10 mg by mouth.  meloxicam (MOBIC) 7.5 mg tablet Take 1 Tablet by mouth daily.  potassium chloride (K-DUR, KLOR-CON M20) 20 mEq tablet Take 1 Tablet by mouth daily.  hydrOXYzine pamoate (VISTARIL) 25 mg capsule Take 25 mg by mouth two (2) times daily as needed.  memantine (NAMENDA) 10 mg tablet Take 1 Tablet by mouth daily.  melatonin 5 mg tablet Take 2 Tablets by mouth nightly.  traZODone (DESYREL) 50 mg tablet Take 0.5 Tablets by mouth nightly. No current facility-administered medications for this visit. Allergies/Drug Reactions  No Known Allergies     Family History  No family history on file.      Social History  Social History     Tobacco Use    Smoking status: Never Smoker    Smokeless tobacco: Never Used   Vaping Use    Vaping Use: Never used   Substance Use Topics    Alcohol use: Not Currently    Drug use: Never        Health Maintenance   Topic Date Due    Depression Screen  Never done    DTaP/Tdap/Td series (1 - Tdap) Never done    Shingrix Vaccine Age 50> (1 of 2) Never done    Bone Densitometry (Dexa) Screening  Never done   Bernestine Amos Medicare Yearly Exam  Never done    Flu Vaccine (1) 09/01/2022    COVID-19 Vaccine  Completed    Pneumococcal 65+ years  Completed     Immunization History   Administered Date(s) Administered    COVID-19, PFIZER PURPLE top, DILUTE for use, (age 15 y+), IM, 30mcg/0.3mL 12/16/2020, 02/04/2021, 10/27/2021    Influenza Vaccine 09/08/2021    Pneumococcal Conjugate (PCV-13) 10/22/2018    Pneumococcal Vaccine (Unspecified Type) 11/08/2019     Laboratory/Tests:  No visits with results within 3 Month(s) from this visit. Latest known visit with results is:   Admission on 12/21/2021, Discharged on 12/22/2021   Component Date Value Ref Range Status    WBC 12/21/2021 13.1  4.6 - 13.2 K/uL Final    RBC 12/21/2021 3.88* 4.20 - 5.30 M/uL Final    HGB 12/21/2021 11.7* 12.0 - 16.0 g/dL Final    HCT 12/21/2021 36.9  35.0 - 45.0 % Final    MCV 12/21/2021 95.1  78.0 - 100.0 FL Final    MCH 12/21/2021 30.2  24.0 - 34.0 PG Final    MCHC 12/21/2021 31.7  31.0 - 37.0 g/dL Final    RDW 12/21/2021 16.8* 11.6 - 14.5 % Final    PLATELET 31/90/4234 382  135 - 420 K/uL Final    MPV 12/21/2021 10.3  9.2 - 11.8 FL Final    NRBC 12/21/2021 0.0  0.0  WBC Final    ABSOLUTE NRBC 12/21/2021 0.00  0.00 - 0.01 K/uL Final    NEUTROPHILS 12/21/2021 76* 40 - 73 % Final    LYMPHOCYTES 12/21/2021 16* 21 - 52 % Final    MONOCYTES 12/21/2021 8  3 - 10 % Final    EOSINOPHILS 12/21/2021 0  0 - 5 % Final    BASOPHILS 12/21/2021 0  0 - 2 % Final    IMMATURE GRANULOCYTES 12/21/2021 0  0 - 0.5 % Final    ABS. NEUTROPHILS 12/21/2021 9.9* 1.8 - 8.0 K/UL Final    ABS. LYMPHOCYTES 12/21/2021 2.0  0.9 - 3.6 K/UL Final    ABS. MONOCYTES 12/21/2021 1.1  0.05 - 1.2 K/UL Final    ABS. EOSINOPHILS 12/21/2021 0.0  0.0 - 0.4 K/UL Final    ABS. BASOPHILS 12/21/2021 0.0  0.0 - 0.1 K/UL Final    ABS. IMM. GRANS.  12/21/2021 0.0  0.00 - 0.04 K/UL Final    DF 12/21/2021 AUTOMATED    Final    Sodium 12/21/2021 141  135 - 145 mmol/L Final    Potassium 12/21/2021 4.6  3.2 - 5.1 mmol/L Final    Chloride 12/21/2021 106  94 - 111 mmol/L Final    CO2 12/21/2021 26  21 - 33 mmol/L Final    Anion gap 12/21/2021 9  mmol/L Final    Glucose 12/21/2021 120* 70 - 110 mg/dL Final    BUN 12/21/2021 21  9 - 21 mg/dL Final    Creatinine 12/21/2021 0.90  0.70 - 1.20 mg/dL Final    BUN/Creatinine ratio 12/21/2021 23    Final    GFR est AA 12/21/2021 >60  ml/min/1.73m2 Final    GFR est non-AA 12/21/2021 58  ml/min/1.73m2 Final    Calcium 12/21/2021 10.5  8.5 - 10.5 mg/dL Final    Bilirubin, total 12/21/2021 0.6  0.2 - 1.0 mg/dL Final    AST (SGOT) 12/21/2021 21  14 - 74 U/L Final    ALT (SGPT) 12/21/2021 13  3 - 52 U/L Final    Alk.  phosphatase 12/21/2021 57  38 - 126 U/L Final    Protein, total 12/21/2021 7.2  6.1 - 8.4 g/dL Final    Albumin 12/21/2021 3.5  3.5 - 4.7 g/dL Final    Globulin 12/21/2021 3.7  g/dL Final    A-G Ratio 12/21/2021 0.9    Final    Ventricular Rate 12/21/2021 87  BPM Final    Atrial Rate 12/21/2021 88  BPM Final    P-R Interval 12/21/2021 55  ms Final    QRS Duration 12/21/2021 87  ms Final    Q-T Interval 12/21/2021 368  ms Final    QTC Calculation (Bezet) 12/21/2021 443  ms Final    Calculated P Axis 12/21/2021 57  degrees Final    Calculated R Axis 12/21/2021 53  degrees Final    Calculated T Axis 12/21/2021 57  degrees Final    Diagnosis 12/21/2021    Final                    Value:Sinus rhythm  Short NC interval  Low voltage, extremity leads    Confirmed by RAMIREZ MARIO ([id number]) on 12/22/2021 4:02:23 PM      Color 12/21/2021 Yellow    Final    Color Reference Range: Straw, Yellow or Dark Yellow    Appearance 12/21/2021 Cloudy    Final    Specific gravity 12/21/2021 1.013  1.005 - 1.030   Final    pH (UA) 12/21/2021 5.0  5.0 - 8.0   Final    Protein 12/21/2021 Negative  Negative mg/dL Final    Glucose 12/21/2021 Negative  Negative mg/dL Final    Ketone 12/21/2021 Negative  Negative mg/dL Final    Bilirubin 12/21/2021 Negative  Negative   Final    Blood 12/21/2021 Moderate* Negative   Final    Urobilinogen 12/21/2021 0.2  0.2 - 1.0 EU/dL Final    Nitrites 12/21/2021 Positive* Negative   Final    Leukocyte Esterase 12/21/2021 Large* Negative   Final    Troponin-I, Qt. 12/21/2021 <0.02* 0.02 - 0.05 ng/mL Final    WBC 12/21/2021 20-50  0 - 4 /hpf Final    RBC 12/21/2021 0-5  0 - 2 /hpf Final    Epithelial cells 12/21/2021 Few  0 - 20 /lpf Final    Epithelial cell category consists of squamous cells and/or transitional urothelial cells. Renal tubular cells, if present, are separately identified as such.     Bacteria 12/21/2021 3+* None /hpf Final    Hyaline cast 12/21/2021 0-2  /lpf Final    Prothrombin time 12/21/2021 14.8  11.5 - 15.2 sec Final    INR 12/21/2021 1.2  0.8 - 1.2   Final    Sodium 12/22/2021 143  135 - 145 mmol/L Final    Potassium 12/22/2021 4.6  3.2 - 5.1 mmol/L Final    Chloride 12/22/2021 108  94 - 111 mmol/L Final    CO2 12/22/2021 27  21 - 33 mmol/L Final    Anion gap 12/22/2021 8  mmol/L Final    Glucose 12/22/2021 108  70 - 110 mg/dL Final    BUN 12/22/2021 21  9 - 21 mg/dL Final    Creatinine 12/22/2021 1.00  0.70 - 1.20 mg/dL Final    BUN/Creatinine ratio 12/22/2021 21    Final    GFR est AA 12/22/2021 >60  ml/min/1.73m2 Final    GFR est non-AA 12/22/2021 51  ml/min/1.73m2 Final    Calcium 12/22/2021 10.9* 8.5 - 10.5 mg/dL Final    WBC 12/22/2021 12.7  4.6 - 13.2 K/uL Final    RBC 12/22/2021 3.93* 4.20 - 5.30 M/uL Final    HGB 12/22/2021 11.7* 12.0 - 16.0 g/dL Final    HCT 12/22/2021 37.3  35.0 - 45.0 % Final    MCV 12/22/2021 94.9  78.0 - 100.0 FL Final    MCH 12/22/2021 29.8  24.0 - 34.0 PG Final    MCHC 12/22/2021 31.4  31.0 - 37.0 g/dL Final    RDW 12/22/2021 16.8* 11.6 - 14.5 % Final    PLATELET 27/39/4542 379  135 - 420 K/uL Final    MPV 12/22/2021 10.1  9.2 - 11.8 FL Final    NRBC 12/22/2021 0.0  0.0  WBC Final    ABSOLUTE NRBC 12/22/2021 0.00  0.00 - 0.01 K/uL Final    NEUTROPHILS 12/22/2021 71  40 - 73 % Final    LYMPHOCYTES 12/22/2021 18* 21 - 52 % Final    MONOCYTES 12/22/2021 11* 3 - 10 % Final    EOSINOPHILS 12/22/2021 0  0 - 5 % Final    BASOPHILS 12/22/2021 0  0 - 2 % Final    IMMATURE GRANULOCYTES 12/22/2021 0  0 - 0.5 % Final    ABS. NEUTROPHILS 12/22/2021 8.9* 1.8 - 8.0 K/UL Final    ABS. LYMPHOCYTES 12/22/2021 2.3  0.9 - 3.6 K/UL Final    ABS. MONOCYTES 12/22/2021 1.3* 0.05 - 1.2 K/UL Final    ABS. EOSINOPHILS 12/22/2021 0.0  0.0 - 0.4 K/UL Final    ABS. BASOPHILS 12/22/2021 0.1  0.0 - 0.1 K/UL Final    ABS. IMM. GRANS. 12/22/2021 0.1* 0.00 - 0.04 K/UL Final    DF 12/22/2021 AUTOMATED    Final    TSH 12/22/2021 2.07  0.35 - 6.20 uIU/mL Final    Comment:    Due to TSH heterogeneity, both structurally and degree of glycosylation, monoclonal antibodies used in the TSH assay may not accurately quantitate TSH. Therefore, this result should be correlated with clinical findings as well as with other assessments of thyroid function, e.g., free T4, free T3.  Special Requests: 12/22/2021 No Special Requests    Final    [place] Count 12/22/2021     Final                    Value:>100,000  colonies/ml      Culture result: 12/22/2021     Final                    Value:>2 organisms - contaminated specimen. suggest recollection  Joe Hernandez2       Patient is 8-year-old female resident of [address] [place] [address] in [place], [place]. Nursing staff reports patient is eating and drinking appropriately. Patient is currently wearing immobilizer to left leg after femur fracture sustained from fall on 12/21/2021. Patient was deemed not a candidate for surgical repair at that time. Patient's right thigh rash does not appear to have worsened and patient reports it is causing no pain or pruritus at this time. Assessment/Plan:    1. Acquired hypothyroidism.   Continue Levothyroxine 25 mcg tablet daily before breakfast for management of acquired upper thyroidism. 2.  Alzheimer's disease. Continue Memantine 10 mg tablet daily for management of Alzheimer's disease. 3.  Vitamin D deficiency. Continue cholecalciferol 1000 units daily for management of vitamin D deficiency. I have discussed the diagnosis with the patient and the intended plan as seen in the above orders. The patient has received an after-visit summary and questions were answered concerning future plans. I have discussed medication side effects and warnings with the patient as well. I have reviewed the plan of care with the patient, accepted their input and they are in agreement with the treatment goals.        Sammi Rodrigues NP  July 7, 2022

## 2022-07-21 ENCOUNTER — HOSPITAL ENCOUNTER (OUTPATIENT)
Dept: LAB | Age: 87
Discharge: HOME | End: 2022-07-21
Payer: MEDICARE

## 2022-07-21 LAB — TSH SERPL DL<=0.05 MIU/L-ACNC: 3.15 UIU/ML (ref 0.36–3.74)

## 2022-07-21 PROCEDURE — 84443 ASSAY THYROID STIM HORMONE: CPT

## 2022-07-21 PROCEDURE — 36415 COLL VENOUS BLD VENIPUNCTURE: CPT

## 2022-09-13 ENCOUNTER — OFFICE VISIT (OUTPATIENT)
Dept: FAMILY MEDICINE CLINIC | Age: 87
End: 2022-09-13
Payer: MEDICARE

## 2022-09-13 VITALS
DIASTOLIC BLOOD PRESSURE: 65 MMHG | TEMPERATURE: 96.9 F | OXYGEN SATURATION: 95 % | SYSTOLIC BLOOD PRESSURE: 121 MMHG | RESPIRATION RATE: 20 BRPM | HEART RATE: 73 BPM

## 2022-09-13 DIAGNOSIS — S72.455D CLOSED NONDISPLACED SUPRACONDYLAR FRACTURE OF DISTAL END OF LEFT FEMUR WITHOUT INTRACONDYLAR EXTENSION WITH ROUTINE HEALING, SUBSEQUENT ENCOUNTER: Primary | ICD-10-CM

## 2022-09-13 DIAGNOSIS — E03.9 ACQUIRED HYPOTHYROIDISM: ICD-10-CM

## 2022-09-13 DIAGNOSIS — F02.80 LATE ONSET ALZHEIMER'S DISEASE WITHOUT BEHAVIORAL DISTURBANCE (HCC): ICD-10-CM

## 2022-09-13 DIAGNOSIS — G30.1 LATE ONSET ALZHEIMER'S DISEASE WITHOUT BEHAVIORAL DISTURBANCE (HCC): ICD-10-CM

## 2022-09-13 PROCEDURE — G8536 NO DOC ELDER MAL SCRN: HCPCS | Performed by: STUDENT IN AN ORGANIZED HEALTH CARE EDUCATION/TRAINING PROGRAM

## 2022-09-13 PROCEDURE — 1123F ACP DISCUSS/DSCN MKR DOCD: CPT | Performed by: STUDENT IN AN ORGANIZED HEALTH CARE EDUCATION/TRAINING PROGRAM

## 2022-09-13 PROCEDURE — G8432 DEP SCR NOT DOC, RNG: HCPCS | Performed by: STUDENT IN AN ORGANIZED HEALTH CARE EDUCATION/TRAINING PROGRAM

## 2022-09-13 PROCEDURE — 1101F PT FALLS ASSESS-DOCD LE1/YR: CPT | Performed by: STUDENT IN AN ORGANIZED HEALTH CARE EDUCATION/TRAINING PROGRAM

## 2022-09-13 PROCEDURE — 99309 SBSQ NF CARE MODERATE MDM 30: CPT | Performed by: STUDENT IN AN ORGANIZED HEALTH CARE EDUCATION/TRAINING PROGRAM

## 2022-09-13 NOTE — PROGRESS NOTES
Subjective:   Devang Pandya is a [place] Gely [age] y.o. female who was seen for routine follow-up. No concerns today. She has an immobilizer has been there since her hip fracture. Nursing staff would like to know if they can remove it. Home Medications    Medication Sig Start Date End Date Taking? Authorizing Provider   aspirin 81 mg chewable tablet  21   Other, MD Dipika   zinc gluconate 100 mg tab  21   Other, MD Dipika   Mucus Relief  mg ER tablet  21   Other, MD Dipika   cholecalciferol (VITAMIN D3) (1000 Units /25 mcg) tablet  21   Other, MD Dipika   Vitamin C 500 mg tablet  21   Other, MD Dipika   Arthritis Pain Relief 650 mg TbER  21   Other, MD Dipika   allopurinoL (ZYLOPRIM) 100 mg tablet Take 1 Tablet by mouth daily. Provider, Historical   levothyroxine (SYNTHROID) 25 mcg tablet Take 1 Tablet by mouth Daily (before breakfast). Provider, Historical   loratadine (CLARITIN) 10 mg tablet Take 10 mg by mouth. Provider, Historical   meloxicam (MOBIC) 7.5 mg tablet Take 1 Tablet by mouth daily. Provider, Historical   potassium chloride (K-DUR, KLOR-CON M20) 20 mEq tablet Take 1 Tablet by mouth daily. Provider, Historical   hydrOXYzine pamoate (VISTARIL) 25 mg capsule Take 25 mg by mouth two (2) times daily as needed. Provider, Historical   memantine (NAMENDA) 10 mg tablet Take 1 Tablet by mouth daily. Provider, Historical   melatonin 5 mg tablet Take 2 Tablets by mouth nightly. Provider, Historical   traZODone (DESYREL) 50 mg tablet Take 0.5 Tablets by mouth nightly. Provider, Historical      No Known Allergies  Social History     Tobacco Use    Smoking status: Never    Smokeless tobacco: Never   Vaping Use    Vaping Use: Never used   Substance Use Topics    Alcohol use: Not Currently    Drug use: Never        Review of Systems   All other systems reviewed and are negative.       Objective:   Visit Vitals  /65   Pulse 73   Temp 96.9 °F (36.1 °C)   Resp 20   SpO2 95%        General: alert, oriented, not in distress  Chest/Lungs: clear breath sounds, no wheezing or crackles  Heart: normal rate, regular rhythm, no murmur  Abdomen: soft, non-distended, non-tender, normal bowel sounds, no organomegaly, no masses  Extremities: no focal deformities, no edema  Skin: no active skin lesions      Assessment & Plan:     1. Closed nondisplaced supracondylar fracture of distal end of left femur without intracondylar extension with routine healing, subsequent encounter  Consult orthopedics to evaluate for immobilizer removal.     2. Acquired hypothyroidism  Continue synthroid    3. Alzheimers Dementia  Continue namenda             I have discussed the diagnosis with the patient and the intended plan as seen in the above orders. The patient has received an after-visit summary and questions were answered concerning future plans. I have discussed medication side effects and warnings with the patient as well. I have reviewed the plan of care with the patient, accepted their input and they are in agreement with the treatment goals. Previous lab and imaging results were reviewed by me.        Sheyla Kearney MD  September 13, 2022

## 2022-10-03 ENCOUNTER — TELEPHONE (OUTPATIENT)
Dept: ORTHOPEDIC SURGERY | Age: 87
End: 2022-10-03

## 2022-10-03 ENCOUNTER — TRANSCRIBE ORDER (OUTPATIENT)
Dept: REGISTRATION | Age: 87
End: 2022-10-03

## 2022-10-03 ENCOUNTER — HOSPITAL ENCOUNTER (OUTPATIENT)
Dept: GENERAL RADIOLOGY | Age: 87
Discharge: HOME | End: 2022-10-03
Attending: STUDENT IN AN ORGANIZED HEALTH CARE EDUCATION/TRAINING PROGRAM
Payer: MEDICARE

## 2022-10-03 DIAGNOSIS — S89.92XA LEFT KNEE INJURY: Primary | ICD-10-CM

## 2022-10-03 DIAGNOSIS — S89.92XA LEFT KNEE INJURY: ICD-10-CM

## 2022-10-03 PROCEDURE — 73562 X-RAY EXAM OF KNEE 3: CPT

## 2022-10-03 NOTE — TELEPHONE ENCOUNTER
Keanu Bearden from Health system is calling regarding this patient asking to speak with you.      [phone]

## 2022-11-03 ENCOUNTER — OFFICE VISIT (OUTPATIENT)
Dept: FAMILY MEDICINE CLINIC | Age: 87
End: 2022-11-03
Payer: MEDICARE

## 2022-11-03 DIAGNOSIS — F02.80 LATE ONSET ALZHEIMER'S DISEASE WITHOUT BEHAVIORAL DISTURBANCE (HCC): Primary | ICD-10-CM

## 2022-11-03 DIAGNOSIS — E03.9 ACQUIRED HYPOTHYROIDISM: ICD-10-CM

## 2022-11-03 DIAGNOSIS — M1A.0710 IDIOPATHIC CHRONIC GOUT OF RIGHT ANKLE WITHOUT TOPHUS: ICD-10-CM

## 2022-11-03 DIAGNOSIS — G30.1 LATE ONSET ALZHEIMER'S DISEASE WITHOUT BEHAVIORAL DISTURBANCE (HCC): Primary | ICD-10-CM

## 2022-11-03 PROCEDURE — G8536 NO DOC ELDER MAL SCRN: HCPCS | Performed by: NURSE PRACTITIONER

## 2022-11-03 PROCEDURE — 1101F PT FALLS ASSESS-DOCD LE1/YR: CPT | Performed by: NURSE PRACTITIONER

## 2022-11-03 PROCEDURE — G8432 DEP SCR NOT DOC, RNG: HCPCS | Performed by: NURSE PRACTITIONER

## 2022-11-03 PROCEDURE — 1123F ACP DISCUSS/DSCN MKR DOCD: CPT | Performed by: NURSE PRACTITIONER

## 2022-11-03 PROCEDURE — 99309 SBSQ NF CARE MODERATE MDM 30: CPT | Performed by: NURSE PRACTITIONER

## 2022-11-03 NOTE — PROGRESS NOTES
History of Present Illness  Concha Harrison is a [place] Gely [age] y.o. female who presents today for: Follow-up. Past Medical History  Past Medical History:   Diagnosis Date    Bradycardia     Dementia (HCC)     Gout     Hypertension     Hypoglycemia     OA (osteoarthritis)         Surgical History  No past surgical history on file. Current Medications  Current Outpatient Medications   Medication Sig    aspirin 81 mg chewable tablet     zinc gluconate 100 mg tab     Mucus Relief  mg ER tablet     cholecalciferol (VITAMIN D3) (1000 Units /25 mcg) tablet     Vitamin C 500 mg tablet     Arthritis Pain Relief 650 mg TbER     allopurinoL (ZYLOPRIM) 100 mg tablet Take 1 Tablet by mouth daily. levothyroxine (SYNTHROID) 25 mcg tablet Take 1 Tablet by mouth Daily (before breakfast). loratadine (CLARITIN) 10 mg tablet Take 10 mg by mouth.    meloxicam (MOBIC) 7.5 mg tablet Take 1 Tablet by mouth daily. potassium chloride (K-DUR, KLOR-CON M20) 20 mEq tablet Take 1 Tablet by mouth daily. hydrOXYzine pamoate (VISTARIL) 25 mg capsule Take 25 mg by mouth two (2) times daily as needed. memantine (NAMENDA) 10 mg tablet Take 1 Tablet by mouth daily. melatonin 5 mg tablet Take 2 Tablets by mouth nightly. traZODone (DESYREL) 50 mg tablet Take 0.5 Tablets by mouth nightly. No current facility-administered medications for this visit. Allergies/Drug Reactions  No Known Allergies     Family History  No family history on file.      Social History  Social History     Tobacco Use    Smoking status: Never    Smokeless tobacco: Never   Vaping Use    Vaping Use: Never used   Substance Use Topics    Alcohol use: Not Currently    Drug use: Never        Health Maintenance   Topic Date Due    Depression Screen  Never done    DTaP/Tdap/Td series (1 - Tdap) Never done    Shingrix Vaccine Age 50> (1 of 2) Never done    Bone Densitometry (Dexa) Screening  Never done    Medicare Yearly Exam  Never done    COVID-19 Vaccine (4 - Booster for Venegas Peter series) 12/22/2021    Flu Vaccine (1) 08/01/2022    Pneumococcal 65+ years  Completed     Immunization History   Administered Date(s) Administered    COVID-19, PFIZER PURPLE top, DILUTE for use, (age 15 y+), IM, 30mcg/0.3mL 12/16/2020, 02/04/2021, 10/27/2021    Influenza Vaccine 09/08/2021    Pneumococcal Conjugate (PCV-13) 10/22/2018    Pneumococcal Vaccine (Unspecified Type) 11/08/2019     Laboratory/Tests:  No visits with results within 3 Month(s) from this visit. Latest known visit with results is:   Hospital Outpatient Visit on 07/21/2022   Component Date Value Ref Range Status    TSH 07/21/2022 3.15  0.36 - 3.74 uIU/mL Final    Comment:    Due to TSH heterogeneity, both structurally and degree of glycosylation, monoclonal antibodies used in the TSH assay may not accurately quantitate TSH. Therefore, this result should be correlated with clinical findings as well as with other assessments of thyroid function, e.g., free T4, free T3. Patient is a 8-year-old female resident of [address] [place] [address]. Nursing staff reports the patient is eating and drinking appropriately. Patient is no longer wearing left leg immobilizer due to left femur fracture. Left leg has no evidence of edema and is neurovascularly intact. Assessment/Plan:    Alzheimer's disease. Continue Memantine 10 Mg Tablet Daily for Management of Alzheimer's Disease. Acquired upper thyroidism. Continue Levothyroxine 25 mcg tablet daily before breakfast for management of acquired hypothyroidism. Idiopathic chronic gout. Continue Allopurinol 100 mg tablet daily for management of idiopathic chronic gout. I have discussed the diagnosis with the patient and the intended plan as seen in the above orders. The patient has received an after-visit summary and questions were answered concerning future plans. I have discussed medication side effects and warnings with the patient as well.  I have reviewed the plan of care with the patient, accepted their input and they are in agreement with the treatment goals.        Ruth Brown NP  November 3, 2022

## 2023-01-16 ENCOUNTER — OFFICE VISIT (OUTPATIENT)
Dept: FAMILY MEDICINE CLINIC | Age: 88
End: 2023-01-16
Payer: MEDICARE

## 2023-01-16 VITALS
RESPIRATION RATE: 20 BRPM | TEMPERATURE: 97.5 F | SYSTOLIC BLOOD PRESSURE: 138 MMHG | DIASTOLIC BLOOD PRESSURE: 56 MMHG | OXYGEN SATURATION: 99 % | HEART RATE: 73 BPM

## 2023-01-16 DIAGNOSIS — M15.9 PRIMARY OSTEOARTHRITIS INVOLVING MULTIPLE JOINTS: ICD-10-CM

## 2023-01-16 DIAGNOSIS — F39 MOOD DISORDER (HCC): ICD-10-CM

## 2023-01-16 DIAGNOSIS — M1A.0710 IDIOPATHIC CHRONIC GOUT OF RIGHT ANKLE WITHOUT TOPHUS: ICD-10-CM

## 2023-01-16 DIAGNOSIS — G30.1 LATE ONSET ALZHEIMER'S DISEASE WITHOUT BEHAVIORAL DISTURBANCE (HCC): Primary | ICD-10-CM

## 2023-01-16 DIAGNOSIS — E03.8 OTHER SPECIFIED HYPOTHYROIDISM: ICD-10-CM

## 2023-01-16 DIAGNOSIS — F02.80 LATE ONSET ALZHEIMER'S DISEASE WITHOUT BEHAVIORAL DISTURBANCE (HCC): Primary | ICD-10-CM

## 2023-01-16 DIAGNOSIS — I10 ESSENTIAL HYPERTENSION: ICD-10-CM

## 2023-01-16 PROBLEM — S72.92XA FEMUR FRACTURE, LEFT (HCC): Status: RESOLVED | Noted: 2021-12-21 | Resolved: 2023-01-16

## 2023-01-16 PROBLEM — R00.1 BRADYCARDIA: Status: RESOLVED | Noted: 2020-08-11 | Resolved: 2023-01-16

## 2023-01-16 PROBLEM — N39.0 UTI (URINARY TRACT INFECTION): Status: RESOLVED | Noted: 2021-12-21 | Resolved: 2023-01-16

## 2023-01-16 PROBLEM — E16.2 HYPOGLYCEMIA: Status: RESOLVED | Noted: 2020-08-11 | Resolved: 2023-01-16

## 2023-01-16 PROCEDURE — 1123F ACP DISCUSS/DSCN MKR DOCD: CPT | Performed by: STUDENT IN AN ORGANIZED HEALTH CARE EDUCATION/TRAINING PROGRAM

## 2023-01-16 PROCEDURE — 99307 SBSQ NF CARE SF MDM 10: CPT | Performed by: STUDENT IN AN ORGANIZED HEALTH CARE EDUCATION/TRAINING PROGRAM

## 2023-01-16 PROCEDURE — G8432 DEP SCR NOT DOC, RNG: HCPCS | Performed by: STUDENT IN AN ORGANIZED HEALTH CARE EDUCATION/TRAINING PROGRAM

## 2023-01-16 PROCEDURE — G8536 NO DOC ELDER MAL SCRN: HCPCS | Performed by: STUDENT IN AN ORGANIZED HEALTH CARE EDUCATION/TRAINING PROGRAM

## 2023-01-16 PROCEDURE — 1101F PT FALLS ASSESS-DOCD LE1/YR: CPT | Performed by: STUDENT IN AN ORGANIZED HEALTH CARE EDUCATION/TRAINING PROGRAM

## 2023-01-16 NOTE — PROGRESS NOTES
Marcia Sellers (: 1922) is a 80 y.o. female was seen at [facility] for the federally mandated 60 day visit. ASSESSMENT/PLAN:  1. Late onset Alzheimer's disease without behavioral disturbance ([facility]  2. Mood disorder ([facility]  3. Primary osteoarthritis involving multiple joints  4. Idiopathic chronic gout of right ankle without tophus  5. Essential hypertension  6. Other specified hypothyroidism    Alzheimer's dementia- stable, continue Memantine  Mood disorder- stable. Continue Trazodone  OA- stable. Continue Tylenol, Meloxicam. Check CBC and kidney function  Gout- stable. Continue Allopurinol  HTN- stable, no meds. Continue to monitor  Hypothyroidism- stable. Continue Levothyroxine    SUBJECTIVE/OBJECTIVE:  Overall, pt is feeling well. Acute concerns: No acute concerns per patient. No acute concerns per nursing. No falls. No wounds or skin breakdown. Eating well. Denies fever/chills, chest pain, SOB, abdominal pain, leg swelling     Past Medical History:   Diagnosis Date    Bradycardia     Dementia ([facility]     Femur fracture, left ([facility] 2021    Gout     Hypertension     Hypoglycemia     OA (osteoarthritis)      No past surgical history on file. No family history on file.     Social History     Socioeconomic History    Marital status:    Tobacco Use    Smoking status: Never    Smokeless tobacco: Never   Vaping Use    Vaping Use: Never used   Substance and Sexual Activity    Alcohol use: Not Currently    Drug use: Never    Sexual activity: Not Currently     Social History     Tobacco Use   Smoking Status Never   Smokeless Tobacco Never       Current Outpatient Medications   Medication Sig Dispense Refill    aspirin 81 mg chewable tablet       zinc gluconate 100 mg tab       Mucus Relief  mg ER tablet       cholecalciferol (VITAMIN D3) (1000 Units /25 mcg) tablet       Vitamin C 500 mg tablet       Arthritis Pain Relief 650 mg TbER       allopurinoL (ZYLOPRIM) 100 mg tablet Take 1 Tablet by mouth daily. levothyroxine (SYNTHROID) 25 mcg tablet Take 1 Tablet by mouth Daily (before breakfast). loratadine (CLARITIN) 10 mg tablet Take 10 mg by mouth.      meloxicam (MOBIC) 7.5 mg tablet Take 1 Tablet by mouth daily. potassium chloride (K-DUR, KLOR-CON M20) 20 mEq tablet Take 1 Tablet by mouth daily. hydrOXYzine pamoate (VISTARIL) 25 mg capsule Take 25 mg by mouth two (2) times daily as needed. memantine (NAMENDA) 10 mg tablet Take 1 Tablet by mouth daily. melatonin 5 mg tablet Take 2 Tablets by mouth nightly. traZODone (DESYREL) 50 mg tablet Take 0.5 Tablets by mouth nightly. No Known Allergies    BP (!) 138/56   Pulse 73   Temp 97.5 °F (36.4 °C)   Resp 20   SpO2 99%     Gen: NAD, well appearing   Heart: RRR, no m/g/r  Lungs: CTA bilaterally, no wheezing, breathing comfortably  Abd: Soft, non tender to palpation, BS+   Ext: No swelling  Psych: cooperative. Appropriate mood and affect. Lab Results   Component Value Date/Time    WBC 12.7 12/22/2021 04:07 AM    HGB 11.7 (L) 12/22/2021 04:07 AM    HCT 37.3 12/22/2021 04:07 AM    PLATELET 144 08/61/4764 04:07 AM    MCV 94.9 12/22/2021 04:07 AM     Lab Results   Component Value Date/Time    Sodium 143 12/22/2021 04:07 AM    Potassium 4.6 12/22/2021 04:07 AM    Chloride 108 12/22/2021 04:07 AM    CO2 27 12/22/2021 04:07 AM    Anion gap 8 12/22/2021 04:07 AM    Glucose 108 12/22/2021 04:07 AM    BUN 21 12/22/2021 04:07 AM    Creatinine 1.00 12/22/2021 04:07 AM    BUN/Creatinine ratio 21 12/22/2021 04:07 AM    GFR est AA >60 12/22/2021 04:07 AM    GFR est non-AA 51 12/22/2021 04:07 AM    Calcium 10.9 (H) 12/22/2021 04:07 AM    Bilirubin, total 0.6 12/21/2021 08:09 PM    Alk.  phosphatase 57 12/21/2021 08:09 PM    Protein, total 7.2 12/21/2021 08:09 PM    Albumin 3.5 12/21/2021 08:09 PM    Globulin 3.7 12/21/2021 08:09 PM    A-G Ratio 0.9 12/21/2021 08:09 PM    ALT (SGPT) 13 12/21/2021 08:09 PM    AST (SGOT) 21 12/21/2021 08:09 PM     No results found for: CHOL, CHOLPOCT, CHOLX, CHLST, CHOLV, TOTCHOLEXT, HDL, HDLPOC, HDLEXT, HDLP, LDL, LDLCPOC, LDLCEXT, LDLC, DLDLP, VLDLC, VLDL, TGLX, TRIGL, TRIGLYCEXT, TRIGP, TGLPOCT, CHHD, CHHDX    On this date 01/16/23 I have spent 15 minutes reviewing previous notes, test results and face to face with the patient for interview/exam, discussing working diagnosis and treatment plan as well as documenting on the day of the visit. Medical decision making complexity: moderate    I have discussed the diagnosis with the patient and the intended plan as seen in the above orders. I have discussed medication side effects and warnings with the patient as well. I have reviewed the plan of care with the patient, accepted their input and they are in agreement with the treatment goals. Previous lab and imaging results were reviewed by me.      David Nicholson MD   Family Medicine

## 2023-01-29 ENCOUNTER — HOSPITAL ENCOUNTER (OUTPATIENT)
Dept: GENERAL RADIOLOGY | Age: 88
End: 2023-01-29
Attending: STUDENT IN AN ORGANIZED HEALTH CARE EDUCATION/TRAINING PROGRAM
Payer: MEDICARE

## 2023-01-29 ENCOUNTER — HOSPITAL ENCOUNTER (OUTPATIENT)
Dept: LAB | Age: 88
Discharge: HOME | End: 2023-01-29
Payer: MEDICARE

## 2023-01-29 DIAGNOSIS — R05.9 COUGH: ICD-10-CM

## 2023-01-29 LAB
ALBUMIN SERPL-MCNC: 2.9 G/DL (ref 3.4–5)
ALBUMIN/GLOB SERPL: 0.7 (ref 0.8–1.7)
ALP SERPL-CCNC: 59 U/L (ref 45–117)
ALT SERPL-CCNC: 14 U/L (ref 13–56)
ANION GAP SERPL CALC-SCNC: 4 MMOL/L (ref 3–18)
APPEARANCE UR: CLEAR
AST SERPL W P-5'-P-CCNC: 9 U/L (ref 10–38)
BACTERIA URNS QL MICRO: ABNORMAL /HPF
BASOPHILS # BLD: 0 K/UL (ref 0–0.1)
BASOPHILS NFR BLD: 0 % (ref 0–2)
BILIRUB SERPL-MCNC: 0.3 MG/DL (ref 0.2–1)
BILIRUB UR QL: NEGATIVE
BUN SERPL-MCNC: 40 MG/DL (ref 7–18)
BUN/CREAT SERPL: 42 (ref 12–20)
CA-I BLD-MCNC: 11.5 MG/DL (ref 8.5–10.1)
CHLORIDE SERPL-SCNC: 118 MMOL/L (ref 100–111)
CO2 SERPL-SCNC: 27 MMOL/L (ref 21–32)
COLOR UR: YELLOW
CREAT SERPL-MCNC: 0.96 MG/DL (ref 0.6–1.3)
DIFFERENTIAL METHOD BLD: ABNORMAL
EOSINOPHIL # BLD: 0 K/UL (ref 0–0.4)
EOSINOPHIL NFR BLD: 0 % (ref 0–5)
EPITH CASTS URNS QL MICRO: ABNORMAL /LPF (ref 0–20)
ERYTHROCYTE [DISTWIDTH] IN BLOOD BY AUTOMATED COUNT: 17.2 % (ref 11.6–14.5)
FLUAV RNA SPEC QL NAA+PROBE: NOT DETECTED
FLUBV RNA SPEC QL NAA+PROBE: NOT DETECTED
GLOBULIN SER CALC-MCNC: 4.3 G/DL (ref 2–4)
GLUCOSE SERPL-MCNC: 99 MG/DL (ref 74–99)
GLUCOSE UR STRIP.AUTO-MCNC: NEGATIVE MG/DL
HCT VFR BLD AUTO: 39.5 % (ref 35–45)
HGB BLD-MCNC: 12.7 G/DL (ref 12–16)
HGB UR QL STRIP: NEGATIVE
IMM GRANULOCYTES # BLD AUTO: 0 K/UL (ref 0–0.04)
IMM GRANULOCYTES NFR BLD AUTO: 0 % (ref 0–0.5)
KETONES UR QL STRIP.AUTO: NEGATIVE MG/DL
LEUKOCYTE ESTERASE UR QL STRIP.AUTO: ABNORMAL
LYMPHOCYTES # BLD: 2.1 K/UL (ref 0.9–3.6)
LYMPHOCYTES NFR BLD: 20 % (ref 21–52)
MCH RBC QN AUTO: 31.3 PG (ref 24–34)
MCHC RBC AUTO-ENTMCNC: 32.2 G/DL (ref 31–37)
MCV RBC AUTO: 97.3 FL (ref 78–100)
MONOCYTES # BLD: 1 K/UL (ref 0.05–1.2)
MONOCYTES NFR BLD: 10 % (ref 3–10)
NEUTS SEG # BLD: 7.3 K/UL (ref 1.8–8)
NEUTS SEG NFR BLD: 70 % (ref 40–73)
NITRITE UR QL STRIP.AUTO: NEGATIVE
NRBC # BLD: 0 K/UL (ref 0–0.01)
NRBC BLD-RTO: 0 PER 100 WBC
PH UR STRIP: 5.5 (ref 5–8)
PLATELET # BLD AUTO: 169 K/UL (ref 135–420)
PMV BLD AUTO: 10.5 FL (ref 9.2–11.8)
POTASSIUM SERPL-SCNC: 4.8 MMOL/L (ref 3.5–5.5)
PROT SERPL-MCNC: 7.2 G/DL (ref 6.4–8.2)
PROT UR STRIP-MCNC: NEGATIVE MG/DL
RBC # BLD AUTO: 4.06 M/UL (ref 4.2–5.3)
RBC #/AREA URNS HPF: ABNORMAL /HPF (ref 0–2)
SARS-COV-2, COV2: NOT DETECTED
SODIUM SERPL-SCNC: 149 MMOL/L (ref 136–145)
SP GR UR REFRACTOMETRY: 1.02 (ref 1–1.03)
UROBILINOGEN UR QL STRIP.AUTO: 0.2 EU/DL (ref 0.2–1)
WBC # BLD AUTO: 10.5 K/UL (ref 4.6–13.2)
WBC URNS QL MICRO: ABNORMAL /HPF (ref 0–4)

## 2023-01-29 PROCEDURE — 85025 COMPLETE CBC W/AUTO DIFF WBC: CPT

## 2023-01-29 PROCEDURE — 71045 X-RAY EXAM CHEST 1 VIEW: CPT

## 2023-01-29 PROCEDURE — 87086 URINE CULTURE/COLONY COUNT: CPT

## 2023-01-29 PROCEDURE — 80053 COMPREHEN METABOLIC PANEL: CPT

## 2023-01-29 PROCEDURE — 81001 URINALYSIS AUTO W/SCOPE: CPT

## 2023-01-29 PROCEDURE — 87636 SARSCOV2 & INF A&B AMP PRB: CPT

## 2023-01-29 PROCEDURE — 36415 COLL VENOUS BLD VENIPUNCTURE: CPT

## 2023-01-30 LAB
BACTERIA SPEC CULT: NORMAL
COLONY COUNT,CNT: NORMAL
SPECIAL REQUESTS,SREQ: NORMAL

## 2023-03-16 ENCOUNTER — OFFICE VISIT (OUTPATIENT)
Facility: CLINIC | Age: 88
End: 2023-03-16

## 2023-03-16 DIAGNOSIS — M1A.0710 IDIOPATHIC CHRONIC GOUT, RIGHT ANKLE AND FOOT, WITHOUT TOPHUS (TOPHI): ICD-10-CM

## 2023-03-16 DIAGNOSIS — E03.9 ACQUIRED HYPOTHYROIDISM: Primary | ICD-10-CM

## 2023-03-16 DIAGNOSIS — R63.4 WEIGHT LOSS: ICD-10-CM

## 2023-03-16 NOTE — PROGRESS NOTES
History of Present Illness  Sommer Sharma is a 80 y.o. female who presents today for: Follow-up. Past Medical History  Past Medical History:   Diagnosis Date    Bradycardia     Dementia ([facility] [address]     Femur fracture, left ([facility] [address] 12/21/2021    Gout     Hypertension     Hypoglycemia     OA (osteoarthritis)         Surgical History  No past surgical history on file.      Current Medications  Current Outpatient Medications   Medication Sig    acetaminophen (TYLENOL) 650 MG extended release tablet ceived the following from [facility]: Outside name: Arthritis Pain Relief 650 mg TbER    allopurinol (ZYLOPRIM) 100 MG tablet Take 1 tablet by mouth daily    ascorbic acid (VITAMIN C) 500 MG tablet ceived the following from [facility]: Outside name: Vitamin C 500 mg tablet    aspirin 81 MG chewable tablet ceived the following from [facility]: Outside name: aspirin 81 mg chewable tablet    vitamin D (CHOLECALCIFEROL) 25 MCG (1000 UT) TABS tablet ceived the following from [facility]: Outside name: cholecalciferol (VITAMIN D3) (1000 Units /25 mcg) tablet    guaiFENesin (MUCINEX) 600 MG extended release tablet ceived the following from [facility]: Outside name: Mucus Relief  mg ER tablet    hydrOXYzine pamoate (VISTARIL) 25 MG capsule Take 25 mg by mouth 2 times daily as needed    levothyroxine (SYNTHROID) 25 MCG tablet Take 1 tablet by mouth every morning (before breakfast)    loratadine (CLARITIN) 10 MG tablet Take 10 mg by mouth    melatonin 5 MG TABS tablet Take 2 tablets by mouth    meloxicam (MOBIC) 7.5 MG tablet Take 1 tablet by mouth daily    memantine (NAMENDA) 10 MG tablet Take 1 tablet by mouth daily    potassium chloride (KLOR-CON M) 20 MEQ extended release tablet Take 1 tablet by mouth daily    traZODone (DESYREL) 50 MG tablet Take 0.5 tablets by mouth    Zinc Gluconate 100 MG TABS ceived the following from [facility]: Outside name: zinc gluconate 100 mg tab     No current facility-administered medications for this visit. Allergies/Drug Reactions  Not on File     Family History  No family history on file. Social History  Social History     Tobacco Use    Smoking status: Never    Smokeless tobacco: Never   Substance Use Topics    Alcohol use: Not Currently    Drug use: Never        Health Maintenance   Topic Date Due    Depression Screen  Never done    DTaP/Tdap/Td vaccine (1 - Tdap) Never done    Shingles vaccine (1 of 2) Never done    Pneumococcal 65+ years Vaccine (2 - PPSV23 if available, else PCV20) 10/22/2019    COVID-19 Vaccine (4 - Booster for All-Scrap series) 12/22/2021    Annual Wellness Visit (AWV)  Never done    Flu vaccine  Completed    Hepatitis A vaccine  Aged Out    Hib vaccine  Aged Out    Meningococcal (ACWY) vaccine  Aged Out     Immunization History   Administered Date(s) Administered    COVID-19, PFIZER PURPLE top, DILUTE for use, (age 15 y+), 30mcg/0.3mL 12/16/2020, 02/04/2021, 10/27/2021    Influenza Virus Vaccine 09/08/2021    Pneumococcal Conjugate 13-valent ([facility] Freedom) 10/22/2018    Pneumococcal Vaccine 11/08/2019     Laboratory/Tests:  No visits with results within 3 Month(s) from this visit. Latest known visit with results is:   No results found for any previous visit. Patient is a 8-year-old female resident of [address] [place] [address] in [place], [place]. As of 3/1/2023 patient is now receiving puréed diet as well as being fed all of her meals. This was changed due to patient pocketing food and then spitting out food. Patient receives  Ensure [place] with meals and Magic Cup daily for increased caloric supplementation. Patient's weight has decreased within the last 2 months. Weight = 182 lbs on 1/22/2023. Weight = 166 lbs on 2/15/2023. Patient's vital signs remain stable. Assessment/Plan:    Acquired hypothyroidism.   Continue Levothyroxine 25 mcg tablet daily for management of acquired hypothyroidism.  Gout.  Continue Allopurinol 100 mg tablet daily for management of gout.  Weight loss.  Continue Ensure [place] with meals and Magic cup daily for management of weight loss.      I have discussed the diagnosis with the patient and the intended plan as seen in the above orders.  The patient has received an after-visit summary and questions were answered concerning future plans.  I have discussed medication side effects and warnings with the patient as well. I have reviewed the plan of care with the patient, accepted their input and they are in agreement with the treatment goals.       Sy Connor, APRN - NP  March 16, 2023

## 2023-05-15 ENCOUNTER — OFFICE VISIT (OUTPATIENT)
Facility: CLINIC | Age: 88
End: 2023-05-15
Payer: MEDICARE

## 2023-05-15 DIAGNOSIS — M1A.0710 IDIOPATHIC CHRONIC GOUT OF RIGHT ANKLE WITHOUT TOPHUS: ICD-10-CM

## 2023-05-15 DIAGNOSIS — F02.80 LATE ONSET ALZHEIMER'S DISEASE WITHOUT BEHAVIORAL DISTURBANCE (HCC): ICD-10-CM

## 2023-05-15 DIAGNOSIS — M15.9 PRIMARY OSTEOARTHRITIS INVOLVING MULTIPLE JOINTS: ICD-10-CM

## 2023-05-15 DIAGNOSIS — F39 MOOD DISORDER (HCC): ICD-10-CM

## 2023-05-15 DIAGNOSIS — I10 ESSENTIAL HYPERTENSION: Primary | ICD-10-CM

## 2023-05-15 DIAGNOSIS — G30.1 LATE ONSET ALZHEIMER'S DISEASE WITHOUT BEHAVIORAL DISTURBANCE (HCC): ICD-10-CM

## 2023-05-15 DIAGNOSIS — E03.8 OTHER SPECIFIED HYPOTHYROIDISM: ICD-10-CM

## 2023-05-15 PROCEDURE — 99308 SBSQ NF CARE LOW MDM 20: CPT | Performed by: STUDENT IN AN ORGANIZED HEALTH CARE EDUCATION/TRAINING PROGRAM

## 2023-05-15 PROCEDURE — 1123F ACP DISCUSS/DSCN MKR DOCD: CPT | Performed by: STUDENT IN AN ORGANIZED HEALTH CARE EDUCATION/TRAINING PROGRAM

## 2023-05-16 VITALS
SYSTOLIC BLOOD PRESSURE: 104 MMHG | TEMPERATURE: 96.8 F | DIASTOLIC BLOOD PRESSURE: 54 MMHG | OXYGEN SATURATION: 96 % | HEART RATE: 60 BPM | RESPIRATION RATE: 18 BRPM

## 2023-05-16 NOTE — PROGRESS NOTES
Marilou Concepcion (: 1922) is a 80 y.o. female was seen at [facility] for the federally mandated 60 day visit. ASSESSMENT/PLAN:  1. Essential hypertension  2. Late onset Alzheimer's disease without behavioral disturbance ([facility] [address]  3. Other specified hypothyroidism  4. Idiopathic chronic gout of right ankle without tophus  5. Primary osteoarthritis involving multiple joints  6. Mood disorder ([facility] [address]    No orders of the defined types were placed in this encounter. Alzheimer's dementia- stable, continue Memantine    Mood disorder- stable. Continue Trazodone    OA- stable. Continue Tylenol, Meloxicam for now- given mild CKD- consider trial of DC Meloxicam and increase Tylenol dosage. ... kidney function has been fairly stable for years however, and she has been on Meloxicam for quite some time for her arthritis pain     Gout- stable. Continue Allopurinol    HTN- stable, no meds. Continue to monitor    Hypothyroidism- stable. Continue Levothyroxine    Hypercalcemia seen on last set of labs 2023- recheck CMP, add PTH and vitamin D    SUBJECTIVE/OBJECTIVE:  Overall, pt is feeling well. Acute concerns: No acute concerns per patient. No acute concerns per nursing. No falls. No wounds or skin breakdown. Eating well. Denies fever/chills, chest pain, SOB, abdominal pain, leg swelling     Vitals:    23 1250   BP: (!) 104/54   Pulse: 60   Resp: 18   Temp: 96.8 °F (36 °C)   SpO2: 96%      There is no height or weight on file to calculate BMI. Gen: NAD, well appearing   Heart: RRR, no m/g/r  Lungs: CTA bilaterally, no wheezing, breathing comfortably  Abd: Soft, non tender to palpation, BS+   Ext: No swelling  Psych: cooperative. Appropriate mood and affect.       On this date 23 I have spent 20 minutes reviewing previous notes, test results and face to face with the patient for interview/exam, discussing working diagnosis and treatment plan as well as documenting on the day of

## 2023-05-24 ENCOUNTER — HOSPITAL ENCOUNTER (OUTPATIENT)
Age: 88
Discharge: HOME | End: 2023-05-27
Payer: MEDICARE

## 2023-05-24 LAB
ALBUMIN SERPL-MCNC: 3.2 G/DL (ref 3.4–5)
ALBUMIN/GLOB SERPL: 0.7 (ref 0.8–1.7)
ALP SERPL-CCNC: 64 U/L (ref 45–117)
ALT SERPL-CCNC: 20 U/L (ref 13–56)
ANION GAP SERPL CALC-SCNC: 5 MMOL/L (ref 3–18)
AST SERPL W P-5'-P-CCNC: 17 U/L (ref 10–38)
BILIRUB SERPL-MCNC: 0.2 MG/DL (ref 0.2–1)
BUN SERPL-MCNC: 44 MG/DL (ref 7–18)
BUN/CREAT SERPL: 49 (ref 12–20)
CA-I BLD-MCNC: 10.9 MG/DL (ref 8.5–10.1)
CHLORIDE SERPL-SCNC: 110 MMOL/L (ref 100–111)
CO2 SERPL-SCNC: 25 MMOL/L (ref 21–32)
CREAT SERPL-MCNC: 0.89 MG/DL (ref 0.6–1.3)
GLOBULIN SER CALC-MCNC: 4.6 G/DL (ref 2–4)
GLUCOSE SERPL-MCNC: 123 MG/DL (ref 74–99)
POTASSIUM SERPL-SCNC: 5.2 MMOL/L (ref 3.5–5.5)
PROT SERPL-MCNC: 7.8 G/DL (ref 6.4–8.2)
SODIUM SERPL-SCNC: 140 MMOL/L (ref 136–145)
TSH SERPL DL<=0.05 MIU/L-ACNC: 2.39 UIU/ML (ref 0.36–3.74)

## 2023-05-24 PROCEDURE — 82306 VITAMIN D 25 HYDROXY: CPT

## 2023-05-24 PROCEDURE — 80053 COMPREHEN METABOLIC PANEL: CPT

## 2023-05-24 PROCEDURE — 83970 ASSAY OF PARATHORMONE: CPT

## 2023-05-24 PROCEDURE — 84443 ASSAY THYROID STIM HORMONE: CPT

## 2023-05-24 PROCEDURE — 36415 COLL VENOUS BLD VENIPUNCTURE: CPT

## 2023-05-25 LAB
25(OH)D3 SERPL-MCNC: 30.8 NG/ML (ref 30–100)
CA-I BLD-MCNC: 11.4 MG/DL (ref 8.5–10.1)
PTH-INTACT SERPL-MCNC: 177.7 PG/ML (ref 18.4–88)

## 2023-07-13 ENCOUNTER — OFFICE VISIT (OUTPATIENT)
Facility: CLINIC | Age: 88
End: 2023-07-13
Payer: MEDICARE

## 2023-07-13 VITALS
OXYGEN SATURATION: 95 % | SYSTOLIC BLOOD PRESSURE: 118 MMHG | BODY MASS INDEX: 26.61 KG/M2 | WEIGHT: 175 LBS | TEMPERATURE: 96.6 F | DIASTOLIC BLOOD PRESSURE: 67 MMHG | RESPIRATION RATE: 18 BRPM | HEART RATE: 65 BPM

## 2023-07-13 DIAGNOSIS — N18.30 STAGE 3 CHRONIC KIDNEY DISEASE, UNSPECIFIED WHETHER STAGE 3A OR 3B CKD (HCC): ICD-10-CM

## 2023-07-13 DIAGNOSIS — E03.9 ACQUIRED HYPOTHYROIDISM: Primary | ICD-10-CM

## 2023-07-13 DIAGNOSIS — M1A.0710 IDIOPATHIC CHRONIC GOUT OF RIGHT ANKLE WITHOUT TOPHUS: ICD-10-CM

## 2023-07-13 DIAGNOSIS — N25.81 SECONDARY HYPERPARATHYROIDISM OF RENAL ORIGIN (HCC): ICD-10-CM

## 2023-07-13 PROCEDURE — 1123F ACP DISCUSS/DSCN MKR DOCD: CPT | Performed by: NURSE PRACTITIONER

## 2023-07-13 PROCEDURE — 99309 SBSQ NF CARE MODERATE MDM 30: CPT | Performed by: NURSE PRACTITIONER

## 2023-07-13 NOTE — PROGRESS NOTES
D Assay. It is recommended that results in the toxic range, >100 ng/mL, be   retested 72 hours post fluorescein exposure. Sodium 05/24/2023 140  136 - 145 mmol/L Final    Potassium 05/24/2023 5.2  3.5 - 5.5 mmol/L Final    Chloride 05/24/2023 110  100 - 111 mmol/L Final    CO2 05/24/2023 25  21 - 32 mmol/L Final    Anion Gap 05/24/2023 5  3.0 - 18.0 mmol/L Final    Glucose 05/24/2023 123 (H)  74 - 99 mg/dL Final    BUN 05/24/2023 44 (H)  7 - 18 mg/dL Final    Creatinine 05/24/2023 0.89  0.60 - 1.30 mg/dL Final    Bun/Cre Ratio 05/24/2023 49 (H)  12 - 20   Final    Est, Glom Filt Rate 05/24/2023 57 (L)  >60 ml/min/1.73m2 Final    Comment:    Pediatric calculator link: Crystal.at. org/professionals/kdoqi/gfr_calculatorped    These results are not intended for use in patients <25years of age. eGFR results are calculated without a race factor using  the 2021 CKD-EPI equation. Careful clinical correlation is recommended, particularly when comparing to results calculated using previous equations. The CKD-EPI equation is less accurate in patients with extremes of muscle mass, extra-renal metabolism of creatinine, excessive creatine ingestion, or following therapy that affects renal tubular secretion. Calcium 05/24/2023 10.9 (H)  8.5 - 10.1 mg/dL Final    Total Bilirubin 05/24/2023 0.2  0.2 - 1.0 mg/dL Final    AST 05/24/2023 17  10 - 38 U/L Final    ALT 05/24/2023 20  13 - 56 U/L Final    Alk Phosphatase 05/24/2023 64  45 - 117 U/L Final    Total Protein 05/24/2023 7.8  6.4 - 8.2 g/dL Final    Albumin 05/24/2023 3.2 (L)  3.4 - 5.0 g/dL Final    Globulin 05/24/2023 4.6 (H)  2.0 - 4.0 g/dL Final    Albumin/Globulin Ratio 05/24/2023 0.7 (L)  0.8 - 1.7   Final    TSH, 3RD GENERATION 05/24/2023 2.39  0.36 - 3.74 uIU/mL Final    Comment:    Due to TSH heterogeneity, both structurally and degree of glycosylation, monoclonal antibodies used in the TSH assay may not accurately quantitate TSH.  Therefore,

## 2024-07-15 ENCOUNTER — HOSPITAL ENCOUNTER (OUTPATIENT)
Age: 89
Setting detail: SPECIMEN
Discharge: HOME | End: 2024-07-18
Attending: INTERNAL MEDICINE
Payer: MEDICARE

## 2024-07-15 LAB
ALBUMIN SERPL-MCNC: 3.1 G/DL (ref 3.4–5)
ALBUMIN/GLOB SERPL: 0.7 (ref 0.8–1.7)
ALP SERPL-CCNC: 65 U/L (ref 45–117)
ALT SERPL-CCNC: 17 U/L (ref 13–56)
ANION GAP SERPL CALC-SCNC: 4 MMOL/L (ref 3–18)
AST SERPL W P-5'-P-CCNC: 16 U/L (ref 10–38)
BASOPHILS # BLD: 0 K/UL (ref 0–0.1)
BASOPHILS NFR BLD: 1 % (ref 0–2)
BILIRUB SERPL-MCNC: 0.4 MG/DL (ref 0.2–1)
BUN SERPL-MCNC: 25 MG/DL (ref 7–18)
BUN/CREAT SERPL: 33 (ref 12–20)
CA-I BLD-MCNC: 11.5 MG/DL (ref 8.5–10.1)
CHLORIDE SERPL-SCNC: 115 MMOL/L (ref 100–111)
CO2 SERPL-SCNC: 25 MMOL/L (ref 21–32)
CREAT SERPL-MCNC: 0.75 MG/DL (ref 0.6–1.3)
DIFFERENTIAL METHOD BLD: ABNORMAL
EOSINOPHIL # BLD: 0 K/UL (ref 0–0.4)
EOSINOPHIL NFR BLD: 0 % (ref 0–5)
ERYTHROCYTE [DISTWIDTH] IN BLOOD BY AUTOMATED COUNT: 16.7 % (ref 11.6–14.5)
GLOBULIN SER CALC-MCNC: 4.4 G/DL (ref 2–4)
GLUCOSE SERPL-MCNC: 70 MG/DL (ref 74–99)
HCT VFR BLD AUTO: 46.5 % (ref 35–45)
HGB BLD-MCNC: 15 G/DL (ref 12–16)
IMM GRANULOCYTES # BLD AUTO: 0 K/UL (ref 0–0.04)
IMM GRANULOCYTES NFR BLD AUTO: 1 % (ref 0–0.5)
LYMPHOCYTES # BLD: 2.3 K/UL (ref 0.9–3.6)
LYMPHOCYTES NFR BLD: 36 % (ref 21–52)
MCH RBC QN AUTO: 31.1 PG (ref 24–34)
MCHC RBC AUTO-ENTMCNC: 32.3 G/DL (ref 31–37)
MCV RBC AUTO: 96.5 FL (ref 78–100)
MONOCYTES # BLD: 0.5 K/UL (ref 0.05–1.2)
MONOCYTES NFR BLD: 7 % (ref 3–10)
NEUTS SEG # BLD: 3.5 K/UL (ref 1.8–8)
NEUTS SEG NFR BLD: 55 % (ref 40–73)
NRBC # BLD: 0 K/UL (ref 0–0.01)
NRBC BLD-RTO: 0 PER 100 WBC
PLATELET # BLD AUTO: 167 K/UL (ref 135–420)
PMV BLD AUTO: 12.3 FL (ref 9.2–11.8)
POTASSIUM SERPL-SCNC: 4.5 MMOL/L (ref 3.5–5.5)
PROT SERPL-MCNC: 7.5 G/DL (ref 6.4–8.2)
RBC # BLD AUTO: 4.82 M/UL (ref 4.2–5.3)
SODIUM SERPL-SCNC: 144 MMOL/L (ref 136–145)
TSH SERPL DL<=0.05 MIU/L-ACNC: 2.32 UIU/ML (ref 0.36–3.74)
WBC # BLD AUTO: 6.4 K/UL (ref 4.6–13.2)

## 2024-07-15 PROCEDURE — 85025 COMPLETE CBC W/AUTO DIFF WBC: CPT

## 2024-07-15 PROCEDURE — 84443 ASSAY THYROID STIM HORMONE: CPT

## 2024-07-15 PROCEDURE — 80053 COMPREHEN METABOLIC PANEL: CPT

## 2024-07-15 PROCEDURE — 36415 COLL VENOUS BLD VENIPUNCTURE: CPT

## 2024-07-16 ENCOUNTER — APPOINTMENT (OUTPATIENT)
Age: 89
End: 2024-07-16
Attending: FAMILY MEDICINE
Payer: MEDICARE

## 2024-07-16 ENCOUNTER — HOSPITAL ENCOUNTER (EMERGENCY)
Age: 89
Discharge: HOME | End: 2024-07-16
Attending: FAMILY MEDICINE
Payer: MEDICARE

## 2024-07-16 ENCOUNTER — APPOINTMENT (OUTPATIENT)
Age: 89
End: 2024-07-16
Payer: MEDICARE

## 2024-07-16 VITALS
WEIGHT: 168 LBS | RESPIRATION RATE: 24 BRPM | SYSTOLIC BLOOD PRESSURE: 144 MMHG | HEIGHT: 66 IN | HEART RATE: 72 BPM | DIASTOLIC BLOOD PRESSURE: 86 MMHG | BODY MASS INDEX: 27 KG/M2 | TEMPERATURE: 98.4 F | OXYGEN SATURATION: 98 %

## 2024-07-16 DIAGNOSIS — R41.82 ALTERED MENTAL STATUS, UNSPECIFIED ALTERED MENTAL STATUS TYPE: Primary | ICD-10-CM

## 2024-07-16 DIAGNOSIS — E86.0 DEHYDRATION: ICD-10-CM

## 2024-07-16 DIAGNOSIS — F03.90 DEMENTIA, UNSPECIFIED DEMENTIA SEVERITY, UNSPECIFIED DEMENTIA TYPE, UNSPECIFIED WHETHER BEHAVIORAL, PSYCHOTIC, OR MOOD DISTURBANCE OR ANXIETY (HCC): ICD-10-CM

## 2024-07-16 LAB
ALBUMIN SERPL-MCNC: 3.3 G/DL (ref 3.4–5)
ALBUMIN/GLOB SERPL: 0.8 (ref 0.8–1.7)
ALP SERPL-CCNC: 65 U/L (ref 45–117)
ALT SERPL-CCNC: 17 U/L (ref 13–56)
ANION GAP SERPL CALC-SCNC: 7 MMOL/L (ref 3–18)
APPEARANCE UR: CLEAR
AST SERPL W P-5'-P-CCNC: 20 U/L (ref 10–38)
BACTERIA URNS QL MICRO: ABNORMAL /HPF
BASOPHILS # BLD: 0.1 K/UL (ref 0–0.1)
BASOPHILS NFR BLD: 1 % (ref 0–2)
BILIRUB SERPL-MCNC: 0.5 MG/DL (ref 0.2–1)
BILIRUB UR QL: NEGATIVE
BUN SERPL-MCNC: 31 MG/DL (ref 7–18)
BUN/CREAT SERPL: 35 (ref 12–20)
CA-I BLD-MCNC: 11.7 MG/DL (ref 8.5–10.1)
CHLORIDE SERPL-SCNC: 115 MMOL/L (ref 100–111)
CO2 SERPL-SCNC: 26 MMOL/L (ref 21–32)
COLOR UR: YELLOW
CREAT SERPL-MCNC: 0.88 MG/DL (ref 0.6–1.3)
DIFFERENTIAL METHOD BLD: ABNORMAL
EOSINOPHIL # BLD: 0 K/UL (ref 0–0.4)
EOSINOPHIL NFR BLD: 0 % (ref 0–5)
EPITH CASTS URNS QL MICRO: ABNORMAL /LPF (ref 0–20)
ERYTHROCYTE [DISTWIDTH] IN BLOOD BY AUTOMATED COUNT: 17.6 % (ref 11.6–14.5)
GLOBULIN SER CALC-MCNC: 4.3 G/DL (ref 2–4)
GLUCOSE SERPL-MCNC: 82 MG/DL (ref 74–99)
GLUCOSE UR STRIP.AUTO-MCNC: NEGATIVE MG/DL
HCT VFR BLD AUTO: 47.6 % (ref 35–45)
HGB BLD-MCNC: 15.1 G/DL (ref 12–16)
HGB UR QL STRIP: ABNORMAL
IMM GRANULOCYTES # BLD AUTO: 0 K/UL (ref 0–0.04)
IMM GRANULOCYTES NFR BLD AUTO: 0 % (ref 0–0.5)
KETONES UR QL STRIP.AUTO: NEGATIVE MG/DL
LACTATE SERPL-SCNC: 1 MMOL/L (ref 0.4–2)
LEUKOCYTE ESTERASE UR QL STRIP.AUTO: ABNORMAL
LYMPHOCYTES # BLD: 4.1 K/UL (ref 0.9–3.6)
LYMPHOCYTES NFR BLD: 49 % (ref 21–52)
MCH RBC QN AUTO: 31.1 PG (ref 24–34)
MCHC RBC AUTO-ENTMCNC: 31.7 G/DL (ref 31–37)
MCV RBC AUTO: 97.9 FL (ref 78–100)
MONOCYTES # BLD: 0.8 K/UL (ref 0.05–1.2)
MONOCYTES NFR BLD: 10 % (ref 3–10)
NEUTS SEG # BLD: 3.2 K/UL (ref 1.8–8)
NEUTS SEG NFR BLD: 40 % (ref 40–73)
NITRITE UR QL STRIP.AUTO: NEGATIVE
NRBC # BLD: 0 K/UL (ref 0–0.01)
NRBC BLD-RTO: 0 PER 100 WBC
PH UR STRIP: 5.5 (ref 5–8)
PLATELET # BLD AUTO: 165 K/UL (ref 135–420)
PMV BLD AUTO: 12.1 FL (ref 9.2–11.8)
POTASSIUM SERPL-SCNC: 5 MMOL/L (ref 3.5–5.5)
PROT SERPL-MCNC: 7.6 G/DL (ref 6.4–8.2)
PROT UR STRIP-MCNC: ABNORMAL MG/DL
RBC # BLD AUTO: 4.86 M/UL (ref 4.2–5.3)
RBC #/AREA URNS HPF: ABNORMAL /HPF (ref 0–2)
SODIUM SERPL-SCNC: 148 MMOL/L (ref 136–145)
SP GR UR REFRACTOMETRY: 1.03 (ref 1–1.03)
TROPONIN I SERPL HS-MCNC: 38 NG/L (ref 0–54)
TROPONIN I SERPL HS-MCNC: 40 NG/L (ref 0–54)
UROBILINOGEN UR QL STRIP.AUTO: 1 EU/DL (ref 0.2–1)
WBC # BLD AUTO: 8.1 K/UL (ref 4.6–13.2)
WBC URNS QL MICRO: ABNORMAL /HPF (ref 0–4)

## 2024-07-16 PROCEDURE — 99285 EMERGENCY DEPT VISIT HI MDM: CPT

## 2024-07-16 PROCEDURE — 2580000003 HC RX 258: Performed by: FAMILY MEDICINE

## 2024-07-16 PROCEDURE — 83605 ASSAY OF LACTIC ACID: CPT

## 2024-07-16 PROCEDURE — 84484 ASSAY OF TROPONIN QUANT: CPT

## 2024-07-16 PROCEDURE — 93005 ELECTROCARDIOGRAM TRACING: CPT | Performed by: FAMILY MEDICINE

## 2024-07-16 PROCEDURE — 70450 CT HEAD/BRAIN W/O DYE: CPT

## 2024-07-16 PROCEDURE — 80053 COMPREHEN METABOLIC PANEL: CPT

## 2024-07-16 PROCEDURE — 71045 X-RAY EXAM CHEST 1 VIEW: CPT

## 2024-07-16 PROCEDURE — 85025 COMPLETE CBC W/AUTO DIFF WBC: CPT

## 2024-07-16 PROCEDURE — 96360 HYDRATION IV INFUSION INIT: CPT

## 2024-07-16 PROCEDURE — 81001 URINALYSIS AUTO W/SCOPE: CPT

## 2024-07-16 RX ORDER — 0.9 % SODIUM CHLORIDE 0.9 %
500 INTRAVENOUS SOLUTION INTRAVENOUS ONCE
Status: COMPLETED | OUTPATIENT
Start: 2024-07-16 | End: 2024-07-16

## 2024-07-16 RX ADMIN — SODIUM CHLORIDE 500 ML: 9 INJECTION, SOLUTION INTRAVENOUS at 17:37

## 2024-07-16 ASSESSMENT — PAIN - FUNCTIONAL ASSESSMENT: PAIN_FUNCTIONAL_ASSESSMENT: NONE - DENIES PAIN

## 2024-07-16 ASSESSMENT — LIFESTYLE VARIABLES
HOW OFTEN DO YOU HAVE A DRINK CONTAINING ALCOHOL: NEVER
HOW MANY STANDARD DRINKS CONTAINING ALCOHOL DO YOU HAVE ON A TYPICAL DAY: PATIENT DOES NOT DRINK

## 2024-07-16 NOTE — DISCHARGE INSTRUCTIONS
As we spoke, no signs of a urinary tract infection, chemical given off on the heart when under stress is negative your lactic acid was negative, blood work shows some slight dehydration.  For which I did give you some fluids.  I do believe that you are slightly dehydrated.  Recommendations are to drink plenty of fluids.  Recommend strict I's and O's.

## 2024-07-16 NOTE — ED TRIAGE NOTES
Brought to the ER from the [facility], pt was ok at about 12:30 pm, was awake talking and had her lunch, Then when she was reassessed again at about 14:30 he was unresponsive

## 2024-07-16 NOTE — ED PROVIDER NOTES
Making  Reviewing records that show that patient is a DO NOT RESUSCITATE.  Unsure the exact etiology behind her altered mental status.  But she does have a history of dementia.  I will CT her head.  Will do the workup at this point.    Patient has become more alert now.  A friend of the family is there who knows her very well states she is back to her baseline.  We did go over her labs including a CT of her head, urine.  It does appear that she is slightly dehydrated.  Difficult to say why she was somewhat altered this morning.  May have been that she did not sleep well and she was somnolent.  She is slightly dehydrated.  I did give her some fluids.  At this point sending her back and encouraged fluid hydration would be in the patient's best interest.    Amount and/or Complexity of Data Reviewed  Labs: ordered.  Radiology: ordered.  ECG/medicine tests: ordered.    Risk  Prescription drug management.            REASSESSMENT          CRITICAL CARE TIME     CONSULTS:  None    PROCEDURES:  Unless otherwise noted below, none     Procedures        FINAL IMPRESSION      1. Altered mental status, unspecified altered mental status type    2. Dehydration    3. Dementia, unspecified dementia severity, unspecified dementia type, unspecified whether behavioral, psychotic, or mood disturbance or anxiety ([facility])          DISPOSITION/PLAN   DISPOSITION Decision To Discharge 07/16/2024 07:07:34 PM      PATIENT REFERRED TO:  Mirta Sheikh, APRN - CNP  [address] [address] [phone]    Schedule an appointment as soon as possible for a visit         DISCHARGE MEDICATIONS:  New Prescriptions    No medications on file     Controlled Substances Monitoring:          No data to display                (Please note that portions of this note were completed with a voice recognition program.  Efforts were made to edit the dictations but occasionally words are mis-transcribed.)    Vincenzo Angelo, DO

## 2024-07-17 LAB
EKG ATRIAL RATE: 65 BPM
EKG DIAGNOSIS: NORMAL
EKG P AXIS: 24 DEGREES
EKG P-R INTERVAL: 152 MS
EKG Q-T INTERVAL: 372 MS
EKG QRS DURATION: 78 MS
EKG QTC CALCULATION (BAZETT): 386 MS
EKG R AXIS: 48 DEGREES
EKG T AXIS: 38 DEGREES
EKG VENTRICULAR RATE: 65 BPM

## 2024-09-06 ENCOUNTER — HOSPITAL ENCOUNTER (OUTPATIENT)
Age: 89
Setting detail: SPECIMEN
Discharge: HOME | End: 2024-09-09
Payer: MEDICARE

## 2024-09-06 LAB
ALBUMIN SERPL-MCNC: 3.1 G/DL (ref 3.4–5)
ALBUMIN/GLOB SERPL: 0.6 (ref 0.8–1.7)
ALP SERPL-CCNC: 76 U/L (ref 45–117)
ALT SERPL-CCNC: 12 U/L (ref 13–56)
ANION GAP SERPL CALC-SCNC: 3 MMOL/L (ref 3–18)
AST SERPL W P-5'-P-CCNC: 18 U/L (ref 10–38)
BASOPHILS # BLD: 0.1 K/UL (ref 0–0.1)
BASOPHILS NFR BLD: 1 % (ref 0–2)
BILIRUB SERPL-MCNC: 0.5 MG/DL (ref 0.2–1)
BUN SERPL-MCNC: 40 MG/DL (ref 7–18)
BUN/CREAT SERPL: 33 (ref 12–20)
CA-I BLD-MCNC: 11.7 MG/DL (ref 8.5–10.1)
CHLORIDE SERPL-SCNC: 137 MMOL/L (ref 100–111)
CO2 SERPL-SCNC: 27 MMOL/L (ref 21–32)
CREAT SERPL-MCNC: 1.22 MG/DL (ref 0.6–1.3)
DIFFERENTIAL METHOD BLD: ABNORMAL
EOSINOPHIL # BLD: 0 K/UL (ref 0–0.4)
EOSINOPHIL NFR BLD: 0 % (ref 0–5)
ERYTHROCYTE [DISTWIDTH] IN BLOOD BY AUTOMATED COUNT: 19.9 % (ref 11.6–14.5)
GLOBULIN SER CALC-MCNC: 5.2 G/DL (ref 2–4)
GLUCOSE SERPL-MCNC: 82 MG/DL (ref 74–99)
HCT VFR BLD AUTO: 53 % (ref 35–45)
HGB BLD-MCNC: 16.6 G/DL (ref 12–16)
IMM GRANULOCYTES # BLD AUTO: 0 K/UL (ref 0–0.04)
IMM GRANULOCYTES NFR BLD AUTO: 0 % (ref 0–0.5)
LYMPHOCYTES # BLD: 2.7 K/UL (ref 0.9–3.6)
LYMPHOCYTES NFR BLD: 31 % (ref 21–52)
MCH RBC QN AUTO: 31.4 PG (ref 24–34)
MCHC RBC AUTO-ENTMCNC: 31.3 G/DL (ref 31–37)
MCV RBC AUTO: 100.4 FL (ref 78–100)
MONOCYTES # BLD: 0.8 K/UL (ref 0.05–1.2)
MONOCYTES NFR BLD: 9 % (ref 3–10)
NEUTS SEG # BLD: 5.2 K/UL (ref 1.8–8)
NEUTS SEG NFR BLD: 59 % (ref 40–73)
NRBC # BLD: 0.03 K/UL (ref 0–0.01)
NRBC BLD-RTO: 0.3 PER 100 WBC
PLATELET # BLD AUTO: 102 K/UL (ref 135–420)
PLATELET COMMENT: ABNORMAL
POTASSIUM SERPL-SCNC: 4 MMOL/L (ref 3.5–5.5)
PROT SERPL-MCNC: 8.3 G/DL (ref 6.4–8.2)
RBC # BLD AUTO: 5.28 M/UL (ref 4.2–5.3)
RBC MORPH BLD: ABNORMAL
SODIUM SERPL-SCNC: 167 MMOL/L (ref 136–145)
WBC # BLD AUTO: 8.8 K/UL (ref 4.6–13.2)

## 2024-09-06 PROCEDURE — 80053 COMPREHEN METABOLIC PANEL: CPT

## 2024-09-06 PROCEDURE — 85025 COMPLETE CBC W/AUTO DIFF WBC: CPT

## 2024-09-07 ENCOUNTER — HOSPITAL ENCOUNTER (OUTPATIENT)
Age: 89
Setting detail: SPECIMEN
Discharge: HOME | End: 2024-09-10
Payer: MEDICARE

## 2024-09-07 LAB
ALBUMIN SERPL-MCNC: 3 G/DL (ref 3.4–5)
ALBUMIN/GLOB SERPL: 0.7 (ref 0.8–1.7)
ALP SERPL-CCNC: 77 U/L (ref 45–117)
ALT SERPL-CCNC: 12 U/L (ref 13–56)
ANION GAP SERPL CALC-SCNC: 2 MMOL/L (ref 3–18)
AST SERPL W P-5'-P-CCNC: 16 U/L (ref 10–38)
BILIRUB SERPL-MCNC: 0.5 MG/DL (ref 0.2–1)
BUN SERPL-MCNC: 43 MG/DL (ref 7–18)
BUN/CREAT SERPL: 38 (ref 12–20)
CA-I BLD-MCNC: 11.3 MG/DL (ref 8.5–10.1)
CHLORIDE SERPL-SCNC: 138 MMOL/L (ref 100–111)
CO2 SERPL-SCNC: 25 MMOL/L (ref 21–32)
CREAT SERPL-MCNC: 1.12 MG/DL (ref 0.6–1.3)
GLOBULIN SER CALC-MCNC: 4.6 G/DL (ref 2–4)
GLUCOSE SERPL-MCNC: 100 MG/DL (ref 74–99)
POTASSIUM SERPL-SCNC: 4.5 MMOL/L (ref 3.5–5.5)
PROT SERPL-MCNC: 7.6 G/DL (ref 6.4–8.2)
SODIUM SERPL-SCNC: 165 MMOL/L (ref 136–145)

## 2024-09-07 PROCEDURE — 36415 COLL VENOUS BLD VENIPUNCTURE: CPT

## 2024-09-07 PROCEDURE — 80053 COMPREHEN METABOLIC PANEL: CPT

## 2024-09-08 LAB
ANION GAP SERPL CALC-SCNC: 4 MMOL/L (ref 3–18)
BUN SERPL-MCNC: 39 MG/DL (ref 7–18)
BUN/CREAT SERPL: 35 (ref 12–20)
CA-I BLD-MCNC: 11.4 MG/DL (ref 8.5–10.1)
CHLORIDE SERPL-SCNC: 134 MMOL/L (ref 100–111)
CO2 SERPL-SCNC: 27 MMOL/L (ref 21–32)
CREAT SERPL-MCNC: 1.1 MG/DL (ref 0.6–1.3)
GLUCOSE SERPL-MCNC: 77 MG/DL (ref 74–99)
POTASSIUM SERPL-SCNC: 3.9 MMOL/L (ref 3.5–5.5)
SODIUM SERPL-SCNC: 165 MMOL/L (ref 136–145)

## 2024-09-08 PROCEDURE — 80048 BASIC METABOLIC PNL TOTAL CA: CPT

## 2024-09-08 PROCEDURE — 36415 COLL VENOUS BLD VENIPUNCTURE: CPT

## 2024-09-09 ENCOUNTER — HOSPITAL ENCOUNTER (OUTPATIENT)
Age: 89
Setting detail: SPECIMEN
Discharge: HOME | End: 2024-09-12
Payer: MEDICARE

## 2024-09-09 LAB
ANION GAP SERPL CALC-SCNC: 4 MMOL/L (ref 3–18)
BUN SERPL-MCNC: 39 MG/DL (ref 7–18)
BUN/CREAT SERPL: 31 (ref 12–20)
CA-I BLD-MCNC: 11.6 MG/DL (ref 8.5–10.1)
CHLORIDE SERPL-SCNC: 135 MMOL/L (ref 100–111)
CO2 SERPL-SCNC: 28 MMOL/L (ref 21–32)
CREAT SERPL-MCNC: 1.24 MG/DL (ref 0.6–1.3)
GLUCOSE SERPL-MCNC: 117 MG/DL (ref 74–99)
POTASSIUM SERPL-SCNC: 3.9 MMOL/L (ref 3.5–5.5)
SODIUM SERPL-SCNC: 167 MMOL/L (ref 136–145)

## 2024-09-09 PROCEDURE — 36415 COLL VENOUS BLD VENIPUNCTURE: CPT

## 2024-09-09 PROCEDURE — 80048 BASIC METABOLIC PNL TOTAL CA: CPT

## 2024-09-11 ENCOUNTER — HOSPITAL ENCOUNTER (OUTPATIENT)
Age: 89
Setting detail: SPECIMEN
Discharge: HOME | End: 2024-09-14
Payer: MEDICARE

## 2024-09-11 LAB
ALBUMIN SERPL-MCNC: 2.8 G/DL (ref 3.4–5)
ALBUMIN/GLOB SERPL: 0.7 (ref 0.8–1.7)
ALP SERPL-CCNC: 75 U/L (ref 45–117)
ALT SERPL-CCNC: 12 U/L (ref 13–56)
ANION GAP SERPL CALC-SCNC: 3 MMOL/L (ref 3–18)
AST SERPL W P-5'-P-CCNC: 15 U/L (ref 10–38)
BILIRUB SERPL-MCNC: 0.3 MG/DL (ref 0.2–1)
BUN SERPL-MCNC: 23 MG/DL (ref 7–18)
BUN/CREAT SERPL: 29 (ref 12–20)
CA-I BLD-MCNC: 10.6 MG/DL (ref 8.5–10.1)
CHLORIDE SERPL-SCNC: 136 MMOL/L (ref 100–111)
CO2 SERPL-SCNC: 24 MMOL/L (ref 21–32)
CREAT SERPL-MCNC: 0.79 MG/DL (ref 0.6–1.3)
GLOBULIN SER CALC-MCNC: 4.1 G/DL (ref 2–4)
GLUCOSE SERPL-MCNC: 87 MG/DL (ref 74–99)
POTASSIUM SERPL-SCNC: 3.7 MMOL/L (ref 3.5–5.5)
PROT SERPL-MCNC: 6.9 G/DL (ref 6.4–8.2)
SODIUM SERPL-SCNC: 163 MMOL/L (ref 136–145)

## 2024-09-11 PROCEDURE — 36415 COLL VENOUS BLD VENIPUNCTURE: CPT

## 2024-09-11 PROCEDURE — 80053 COMPREHEN METABOLIC PANEL: CPT

## 2024-09-12 ENCOUNTER — HOSPITAL ENCOUNTER (OUTPATIENT)
Age: 89
Setting detail: SPECIMEN
Discharge: HOME | End: 2024-09-15
Payer: MEDICARE

## 2024-09-12 ENCOUNTER — HOSPITAL ENCOUNTER (INPATIENT)
Age: 89
LOS: 2 days | Discharge: NURSING, REHAB OR LONG-TERM CARE | End: 2024-09-14
Attending: FAMILY MEDICINE | Admitting: FAMILY MEDICINE
Payer: MEDICARE

## 2024-09-12 DIAGNOSIS — E83.52 HYPERCALCEMIA: ICD-10-CM

## 2024-09-12 DIAGNOSIS — E87.0 HYPERNATREMIA: Primary | ICD-10-CM

## 2024-09-12 DIAGNOSIS — N30.01 ACUTE CYSTITIS WITH HEMATURIA: ICD-10-CM

## 2024-09-12 LAB
ALBUMIN SERPL-MCNC: 2.5 G/DL (ref 3.4–5)
ALBUMIN SERPL-MCNC: 2.8 G/DL (ref 3.4–5)
ALBUMIN/GLOB SERPL: 0.6 (ref 0.8–1.7)
ALBUMIN/GLOB SERPL: 0.6 (ref 0.8–1.7)
ALP SERPL-CCNC: 71 U/L (ref 45–117)
ALP SERPL-CCNC: 76 U/L (ref 45–117)
ALT SERPL-CCNC: 12 U/L (ref 13–56)
ALT SERPL-CCNC: 13 U/L (ref 13–56)
ANION GAP SERPL CALC-SCNC: 1 MMOL/L (ref 3–18)
ANION GAP SERPL CALC-SCNC: 2 MMOL/L (ref 3–18)
APPEARANCE UR: ABNORMAL
AST SERPL W P-5'-P-CCNC: 15 U/L (ref 10–38)
AST SERPL W P-5'-P-CCNC: 17 U/L (ref 10–38)
BACTERIA URNS QL MICRO: ABNORMAL /HPF
BASOPHILS # BLD: 0.1 K/UL (ref 0–0.1)
BASOPHILS NFR BLD: 1 % (ref 0–2)
BILIRUB SERPL-MCNC: 0.3 MG/DL (ref 0.2–1)
BILIRUB SERPL-MCNC: 0.4 MG/DL (ref 0.2–1)
BILIRUB UR QL: NEGATIVE
BUN SERPL-MCNC: 27 MG/DL (ref 7–18)
BUN SERPL-MCNC: 28 MG/DL (ref 7–18)
BUN/CREAT SERPL: 27 (ref 12–20)
BUN/CREAT SERPL: 28 (ref 12–20)
CA-I BLD-MCNC: 10.5 MG/DL (ref 8.5–10.1)
CA-I BLD-MCNC: 10.9 MG/DL (ref 8.5–10.1)
CHLORIDE SERPL-SCNC: 133 MMOL/L (ref 100–111)
CHLORIDE SERPL-SCNC: 134 MMOL/L (ref 100–111)
CO2 SERPL-SCNC: 26 MMOL/L (ref 21–32)
CO2 SERPL-SCNC: 27 MMOL/L (ref 21–32)
COLOR UR: YELLOW
CREAT SERPL-MCNC: 0.98 MG/DL (ref 0.6–1.3)
CREAT SERPL-MCNC: 1.04 MG/DL (ref 0.6–1.3)
DIFFERENTIAL METHOD BLD: ABNORMAL
EOSINOPHIL # BLD: 0 K/UL (ref 0–0.4)
EOSINOPHIL NFR BLD: 0 % (ref 0–5)
EPITH CASTS URNS QL MICRO: ABNORMAL /LPF (ref 0–20)
ERYTHROCYTE [DISTWIDTH] IN BLOOD BY AUTOMATED COUNT: 18.6 % (ref 11.6–14.5)
GLOBULIN SER CALC-MCNC: 4.1 G/DL (ref 2–4)
GLOBULIN SER CALC-MCNC: 4.5 G/DL (ref 2–4)
GLUCOSE SERPL-MCNC: 94 MG/DL (ref 74–99)
GLUCOSE SERPL-MCNC: 98 MG/DL (ref 74–99)
GLUCOSE UR STRIP.AUTO-MCNC: NEGATIVE MG/DL
HCT VFR BLD AUTO: 45.1 % (ref 35–45)
HGB BLD-MCNC: 14.3 G/DL (ref 12–16)
HGB UR QL STRIP: ABNORMAL
IMM GRANULOCYTES # BLD AUTO: 0 K/UL (ref 0–0.04)
IMM GRANULOCYTES NFR BLD AUTO: 0 % (ref 0–0.5)
KETONES UR QL STRIP.AUTO: NEGATIVE MG/DL
LEUKOCYTE ESTERASE UR QL STRIP.AUTO: ABNORMAL
LYMPHOCYTES # BLD: 2.8 K/UL (ref 0.9–3.6)
LYMPHOCYTES NFR BLD: 43 % (ref 21–52)
MCH RBC QN AUTO: 31.5 PG (ref 24–34)
MCHC RBC AUTO-ENTMCNC: 31.7 G/DL (ref 31–37)
MCV RBC AUTO: 99.3 FL (ref 78–100)
MONOCYTES # BLD: 0.7 K/UL (ref 0.05–1.2)
MONOCYTES NFR BLD: 10 % (ref 3–10)
NEUTS SEG # BLD: 3 K/UL (ref 1.8–8)
NEUTS SEG NFR BLD: 46 % (ref 40–73)
NITRITE UR QL STRIP.AUTO: POSITIVE
NRBC # BLD: 0.05 K/UL (ref 0–0.01)
NRBC BLD-RTO: 0.8 PER 100 WBC
PH UR STRIP: 5 (ref 5–8)
PLATELET # BLD AUTO: 92 K/UL (ref 135–420)
PLATELET COMMENT: ABNORMAL
PMV BLD AUTO: 13.3 FL (ref 9.2–11.8)
POTASSIUM SERPL-SCNC: 3.6 MMOL/L (ref 3.5–5.5)
POTASSIUM SERPL-SCNC: 3.6 MMOL/L (ref 3.5–5.5)
PROT SERPL-MCNC: 6.6 G/DL (ref 6.4–8.2)
PROT SERPL-MCNC: 7.3 G/DL (ref 6.4–8.2)
PROT UR STRIP-MCNC: 30 MG/DL
RBC # BLD AUTO: 4.54 M/UL (ref 4.2–5.3)
RBC #/AREA URNS HPF: >100 /HPF (ref 0–2)
RBC MORPH BLD: ABNORMAL
SODIUM SERPL-SCNC: 161 MMOL/L (ref 136–145)
SODIUM SERPL-SCNC: 162 MMOL/L (ref 136–145)
SP GR UR REFRACTOMETRY: 1.02 (ref 1–1.03)
UROBILINOGEN UR QL STRIP.AUTO: 0.2 EU/DL (ref 0.2–1)
WBC # BLD AUTO: 6.6 K/UL (ref 4.6–13.2)
WBC URNS QL MICRO: >100 /HPF (ref 0–4)

## 2024-09-12 PROCEDURE — 6360000002 HC RX W HCPCS: Performed by: FAMILY MEDICINE

## 2024-09-12 PROCEDURE — 6370000000 HC RX 637 (ALT 250 FOR IP): Performed by: INTERNAL MEDICINE

## 2024-09-12 PROCEDURE — 81001 URINALYSIS AUTO W/SCOPE: CPT

## 2024-09-12 PROCEDURE — 94761 N-INVAS EAR/PLS OXIMETRY MLT: CPT

## 2024-09-12 PROCEDURE — 80053 COMPREHEN METABOLIC PANEL: CPT

## 2024-09-12 PROCEDURE — 99285 EMERGENCY DEPT VISIT HI MDM: CPT

## 2024-09-12 PROCEDURE — 2580000003 HC RX 258: Performed by: FAMILY MEDICINE

## 2024-09-12 PROCEDURE — 6360000002 HC RX W HCPCS: Performed by: INTERNAL MEDICINE

## 2024-09-12 PROCEDURE — 2580000003 HC RX 258: Performed by: INTERNAL MEDICINE

## 2024-09-12 PROCEDURE — 1100000000 HC RM PRIVATE

## 2024-09-12 PROCEDURE — 85025 COMPLETE CBC W/AUTO DIFF WBC: CPT

## 2024-09-12 PROCEDURE — 36415 COLL VENOUS BLD VENIPUNCTURE: CPT

## 2024-09-12 RX ORDER — ACETAMINOPHEN 325 MG/1
650 TABLET ORAL EVERY 6 HOURS PRN
Status: DISCONTINUED | OUTPATIENT
Start: 2024-09-12 | End: 2024-09-14 | Stop reason: HOSPADM

## 2024-09-12 RX ORDER — ACETAMINOPHEN 650 MG/1
650 SUPPOSITORY RECTAL EVERY 6 HOURS PRN
Status: DISCONTINUED | OUTPATIENT
Start: 2024-09-12 | End: 2024-09-14 | Stop reason: HOSPADM

## 2024-09-12 RX ORDER — LOPERAMIDE HCL 2 MG
2 CAPSULE ORAL PRN
COMMUNITY

## 2024-09-12 RX ORDER — ONDANSETRON 2 MG/ML
4 INJECTION INTRAMUSCULAR; INTRAVENOUS EVERY 6 HOURS PRN
Status: DISCONTINUED | OUTPATIENT
Start: 2024-09-12 | End: 2024-09-14 | Stop reason: HOSPADM

## 2024-09-12 RX ORDER — MEMANTINE HYDROCHLORIDE 5 MG/1
10 TABLET ORAL 2 TIMES DAILY
Status: DISCONTINUED | OUTPATIENT
Start: 2024-09-12 | End: 2024-09-14 | Stop reason: HOSPADM

## 2024-09-12 RX ORDER — ENOXAPARIN SODIUM 100 MG/ML
30 INJECTION SUBCUTANEOUS DAILY
Status: DISCONTINUED | OUTPATIENT
Start: 2024-09-12 | End: 2024-09-14 | Stop reason: HOSPADM

## 2024-09-12 RX ORDER — MEMANTINE HYDROCHLORIDE 10 MG/1
10 TABLET ORAL 2 TIMES DAILY
COMMUNITY

## 2024-09-12 RX ORDER — CIPROFLOXACIN HYDROCHLORIDE 3.5 MG/ML
1 SOLUTION/ DROPS TOPICAL
Status: DISCONTINUED | OUTPATIENT
Start: 2024-09-12 | End: 2024-09-14 | Stop reason: HOSPADM

## 2024-09-12 RX ORDER — ONDANSETRON 4 MG/1
4 TABLET, ORALLY DISINTEGRATING ORAL EVERY 8 HOURS PRN
Status: DISCONTINUED | OUTPATIENT
Start: 2024-09-12 | End: 2024-09-14 | Stop reason: HOSPADM

## 2024-09-12 RX ORDER — SODIUM CHLORIDE 450 MG/100ML
INJECTION, SOLUTION INTRAVENOUS CONTINUOUS
Status: DISCONTINUED | OUTPATIENT
Start: 2024-09-12 | End: 2024-09-14 | Stop reason: HOSPADM

## 2024-09-12 RX ORDER — SODIUM CHLORIDE 0.9 % (FLUSH) 0.9 %
5-40 SYRINGE (ML) INJECTION PRN
Status: DISCONTINUED | OUTPATIENT
Start: 2024-09-12 | End: 2024-09-14 | Stop reason: HOSPADM

## 2024-09-12 RX ORDER — LEVOTHYROXINE SODIUM 50 UG/1
25 TABLET ORAL
Status: DISCONTINUED | OUTPATIENT
Start: 2024-09-13 | End: 2024-09-14 | Stop reason: HOSPADM

## 2024-09-12 RX ORDER — SODIUM CHLORIDE 0.9 % (FLUSH) 0.9 %
5-40 SYRINGE (ML) INJECTION EVERY 12 HOURS SCHEDULED
Status: DISCONTINUED | OUTPATIENT
Start: 2024-09-12 | End: 2024-09-14 | Stop reason: HOSPADM

## 2024-09-12 RX ORDER — ACETAMINOPHEN 500 MG
1000 TABLET ORAL DAILY
COMMUNITY

## 2024-09-12 RX ORDER — POLYETHYLENE GLYCOL 3350 17 G/17G
17 POWDER, FOR SOLUTION ORAL DAILY PRN
Status: DISCONTINUED | OUTPATIENT
Start: 2024-09-12 | End: 2024-09-14 | Stop reason: HOSPADM

## 2024-09-12 RX ORDER — ASPIRIN 81 MG/1
81 TABLET, CHEWABLE ORAL DAILY
Status: DISCONTINUED | OUTPATIENT
Start: 2024-09-13 | End: 2024-09-14 | Stop reason: HOSPADM

## 2024-09-12 RX ORDER — SODIUM CHLORIDE 9 MG/ML
INJECTION, SOLUTION INTRAVENOUS PRN
Status: DISCONTINUED | OUTPATIENT
Start: 2024-09-12 | End: 2024-09-14 | Stop reason: HOSPADM

## 2024-09-12 RX ADMIN — SODIUM CHLORIDE: 4.5 INJECTION, SOLUTION INTRAVENOUS at 17:07

## 2024-09-12 RX ADMIN — WATER 1000 MG: 1 INJECTION INTRAMUSCULAR; INTRAVENOUS; SUBCUTANEOUS at 06:50

## 2024-09-12 RX ADMIN — ENOXAPARIN SODIUM 30 MG: 100 INJECTION SUBCUTANEOUS at 14:18

## 2024-09-12 RX ADMIN — CIPROFLOXACIN 1 DROP: 3 SOLUTION OPHTHALMIC at 12:15

## 2024-09-12 RX ADMIN — CIPROFLOXACIN 1 DROP: 3 SOLUTION OPHTHALMIC at 22:16

## 2024-09-12 RX ADMIN — CIPROFLOXACIN 1 DROP: 3 SOLUTION OPHTHALMIC at 11:01

## 2024-09-12 RX ADMIN — SODIUM CHLORIDE: 4.5 INJECTION, SOLUTION INTRAVENOUS at 09:18

## 2024-09-12 RX ADMIN — CIPROFLOXACIN 1 DROP: 3 SOLUTION OPHTHALMIC at 19:32

## 2024-09-12 RX ADMIN — MEMANTINE 10 MG: 5 TABLET ORAL at 20:07

## 2024-09-12 RX ADMIN — SODIUM CHLORIDE, PRESERVATIVE FREE 10 ML: 5 INJECTION INTRAVENOUS at 19:34

## 2024-09-12 RX ADMIN — CIPROFLOXACIN 1 DROP: 3 SOLUTION OPHTHALMIC at 18:42

## 2024-09-12 RX ADMIN — CIPROFLOXACIN 1 DROP: 3 SOLUTION OPHTHALMIC at 17:05

## 2024-09-12 RX ADMIN — CIPROFLOXACIN 1 DROP: 3 SOLUTION OPHTHALMIC at 14:16

## 2024-09-12 NOTE — PLAN OF CARE
Problem: Safety - Adult  Goal: Free from fall injury  9/12/2024 0950 by Nikos Neville RN  Outcome: Progressing  9/12/2024 0949 by Nikos Neville RN  Flowsheets (Taken 9/12/2024 0949)  Free From Fall Injury:   Instruct family/caregiver on patient safety   Based on caregiver fall risk screen, instruct family/caregiver to ask for assistance with transferring infant if caregiver noted to have fall risk factors  9/12/2024 0949 by Nikos Neville RN  Outcome: Progressing  Flowsheets (Taken 9/12/2024 0949)  Free From Fall Injury:   Instruct family/caregiver on patient safety   Based on caregiver fall risk screen, instruct family/caregiver to ask for assistance with transferring infant if caregiver noted to have fall risk factors     Problem: Discharge Planning  Goal: Discharge to home or other facility with appropriate resources  9/12/2024 0950 by Nikos Neville RN  Outcome: Progressing  9/12/2024 0949 by Nikos Neville RN  Flowsheets (Taken 9/12/2024 0949)  Discharge to home or other facility with appropriate resources:   Identify barriers to discharge with patient and caregiver   Identify discharge learning needs (meds, wound care, etc)   Arrange for needed discharge resources and transportation as appropriate   Refer to discharge planning if patient needs post-hospital services based on physician order or complex needs related to functional status, cognitive ability or social support system  9/12/2024 0949 by Nikos Neville RN  Outcome: Progressing  Flowsheets  Taken 9/12/2024 0949  Discharge to home or other facility with appropriate resources:   Identify barriers to discharge with patient and caregiver   Identify discharge learning needs (meds, wound care, etc)   Arrange for needed discharge resources and transportation as appropriate   Refer to discharge planning if patient needs post-hospital services based on physician order or complex needs related to functional status, cognitive ability or social  support system  Taken 9/12/2024 0750  Discharge to home or other facility with appropriate resources:   Identify barriers to discharge with patient and caregiver   Arrange for needed discharge resources and transportation as appropriate   Identify discharge learning needs (meds, wound care, etc)     Problem: ABCDS Injury Assessment  Goal: Absence of physical injury  9/12/2024 0950 by Nikos Neville, RN  Outcome: Progressing  9/12/2024 0949 by Nikos Neville, RN  Flowsheets (Taken 9/12/2024 0949)  Absence of Physical Injury: Implement safety measures based on patient assessment  9/12/2024 0949 by Nikos Neville, RN  Outcome: Progressing  Flowsheets (Taken 9/12/2024 0949)  Absence of Physical Injury: Implement safety measures based on patient assessment

## 2024-09-12 NOTE — PROGRESS NOTES
Admission Medication Reconciliation:    Information obtained from:  EP MAR    Comments/Recommendations: Reviewed PTA medications and patient's allergies.    Reviewed and updated        Allergies:  Patient has no known allergies.    Significant PMH/Disease States:   Past Medical History:   Diagnosis Date    Bradycardia     Dementia (HCC)     Femur fracture, left (HCC) 12/21/2021    Gout     Hypertension     Hypoglycemia     OA (osteoarthritis)      Chief Complaint for this Admission:    Chief Complaint   Patient presents with    abnormal labs     Prior to Admission Medications:   Prior to Admission medications    Medication Sig Start Date End Date Taking? Authorizing Provider   memantine (NAMENDA) 10 MG tablet Take 1 tablet by mouth 2 times daily   Yes Arlen Ornelas MD   acetaminophen (TYLENOL) 500 MG tablet Take 2 tablets by mouth daily   Yes Arlen Ornelas MD   allopurinol (ZYLOPRIM) 100 MG tablet Take 1 tablet by mouth daily   Yes Automatic Reconciliation, Ar   aspirin 81 MG chewable tablet ceived the following from [facility]: Outside name: aspirin 81 mg chewable tablet 11/23/21  Yes Automatic Reconciliation, Ar   levothyroxine (SYNTHROID) 25 MCG tablet Take 1 tablet by mouth every morning (before breakfast)   Yes Automatic Reconciliation, Ar   loratadine (CLARITIN) 10 MG tablet Take 1 tablet by mouth   Yes Automatic Reconciliation, Ar   melatonin 10 MG CAPS capsule Take 1 capsule by mouth nightly   Yes Automatic Reconciliation, Ar   potassium chloride (KLOR-CON M) 20 MEQ extended release tablet Take 1 tablet by mouth daily   Yes Automatic Reconciliation, Ar   zinc gluconate 50 MG tablet Take 1 tablet by mouth daily ceived the following from [facility]: Outside name: zinc gluconate 100 mg tab 11/23/21  Yes Automatic Reconciliation, Ar   loperamide (IMODIUM) 2 MG capsule Take 1 capsule by mouth as needed for Diarrhea Max 16mg/day    ProviderArlen MD    acetaminophen (TYLENOL) 650 MG extended release tablet Take 1 tablet by mouth every 12 hours as needed for Pain (Pain scale 1-3) ceived the following from [facility]: Outside name: Arthritis Pain Relief 650 mg TbER 11/23/21   Automatic Reconciliation, Ar   ascorbic acid (VITAMIN C) 500 MG tablet ceived the following from [facility]: Outside name: Vitamin C 500 mg tablet 11/23/21 9/12/24  Automatic Reconciliation, Ar   vitamin D (CHOLECALCIFEROL) 25 MCG (1000 UT) TABS tablet ceived the following from [facility]: Outside name: cholecalciferol (VITAMIN D3) (1000 Units /25 mcg) tablet 11/23/21 9/12/24  Automatic Reconciliation, Ar   guaiFENesin (MUCINEX) 600 MG extended release tablet ceived the following from [facility]: Outside name: Mucus Relief  mg ER tablet  Patient not taking: Reported on 7/16/2024 11/23/21 9/12/24  Automatic Reconciliation, Ar   hydrOXYzine pamoate (VISTARIL) 25 MG capsule Take 25 mg by mouth 2 times daily as needed  Patient not taking: Reported on 7/16/2024 9/12/24  Automatic Reconciliation, Ar   meloxicam (MOBIC) 7.5 MG tablet Take 1 tablet by mouth daily  Patient not taking: Reported on 7/16/2024 9/12/24  Automatic Reconciliation, Ar   memantine (NAMENDA) 10 MG tablet Take 1 tablet by mouth daily  Patient not taking: Reported on 7/16/2024 9/12/24  Automatic Reconciliation, Ar   traZODone (DESYREL) 50 MG tablet Take 0.5 tablets by mouth  Patient not taking: Reported on 7/16/2024 9/12/24  Automatic Reconciliation, Ar       Marco Spencer [facility]

## 2024-09-12 NOTE — ED PROVIDER NOTES
[facility] EMERGENCY DEPT  EMERGENCY DEPARTMENT ENCOUNTER      Pt Name: Naima Lu  MRN: [id number]  Birthdate 4/28/1922  Date of evaluation: 9/12/2024  Provider: Lurdes Lacy,   6:13 AM    CHIEF COMPLAINT       Chief Complaint   Patient presents with    abnormal labs         HISTORY OF PRESENT ILLNESS    Naima Lu is a 89 y.o. female who presents to the emergency department abnormal labs     Patient presents to the ED from EP for elevated sodium. Patient unable to give HPI due to dementia history. EP staff report patient has been being treated for elevated sodium. NP wrote in her note today that if recheck of Na was above 160, to send the patient to the ED. EP staff deny any acute changes in patient's baseline behavior or mental status    The history is provided by the nursing home and medical records.       Nursing Notes were reviewed.    REVIEW OF SYSTEMS       Review of Systems   Unable to perform ROS: Dementia       Except as noted above the remainder of the review of systems was reviewed and negative.       PAST MEDICAL HISTORY     Past Medical History:   Diagnosis Date    Bradycardia     Dementia (HCC)     Femur fracture, left (HCC) 12/21/2021    Gout     Hypertension     Hypoglycemia     OA (osteoarthritis)          SURGICAL HISTORY     History reviewed. No pertinent surgical history.      CURRENT MEDICATIONS       Previous Medications    ACETAMINOPHEN (TYLENOL) 650 MG EXTENDED RELEASE TABLET    ceived the following from [facility]: Outside name: Arthritis Pain Relief 650 mg TbER    ALLOPURINOL (ZYLOPRIM) 100 MG TABLET    Take 1 tablet by mouth daily    ASCORBIC ACID (VITAMIN C) 500 MG TABLET    ceived the following from [facility]: Outside name: Vitamin C 500 mg tablet    ASPIRIN 81 MG CHEWABLE TABLET    ceived the following from [facility]: Outside name: aspirin 81 mg chewable tablet    GUAIFENESIN (MUCINEX) 600 MG EXTENDED RELEASE TABLET     ceived the following from [facility]: Outside name: Mucus Relief  mg ER tablet    HYDROXYZINE PAMOATE (VISTARIL) 25 MG CAPSULE    Take 25 mg by mouth 2 times daily as needed    LEVOTHYROXINE (SYNTHROID) 25 MCG TABLET    Take 1 tablet by mouth every morning (before breakfast)    LORATADINE (CLARITIN) 10 MG TABLET    Take 10 mg by mouth    MELATONIN 5 MG TABS TABLET    Take 2 tablets by mouth    MELOXICAM (MOBIC) 7.5 MG TABLET    Take 1 tablet by mouth daily    MEMANTINE (NAMENDA) 10 MG TABLET    Take 1 tablet by mouth daily    POTASSIUM CHLORIDE (KLOR-CON M) 20 MEQ EXTENDED RELEASE TABLET    Take 1 tablet by mouth daily    TRAZODONE (DESYREL) 50 MG TABLET    Take 0.5 tablets by mouth    VITAMIN D (CHOLECALCIFEROL) 25 MCG (1000 UT) TABS TABLET    ceived the following from [facility]: Outside name: cholecalciferol (VITAMIN D3) (1000 Units /25 mcg) tablet    ZINC GLUCONATE 100 MG TABS    ceived the following from [facility]: Outside name: zinc gluconate 100 mg tab       ALLERGIES     Patient has no known allergies.    FAMILY HISTORY     History reviewed. No pertinent family history.       SOCIAL HISTORY       Social History     Socioeconomic History    Marital status:      Spouse name: None    Number of children: None    Years of education: None    Highest education level: None   Tobacco Use    Smoking status: Never    Smokeless tobacco: Never   Substance and Sexual Activity    Alcohol use: Not Currently    Drug use: Never       SCREENINGS         [place] Coma Scale  Eye Opening: To pain  Best Verbal Response: Confused  Best Motor Response: Withdraws from pain  [place] Coma Scale Score: 10                     CIWA Assessment  BP: 120/66  Pulse: 80                 PHYSICAL EXAM       ED Triage Vitals [09/12/24 0518]   BP Systolic BP Percentile Diastolic BP Percentile Temp Temp Source Pulse Respirations SpO2   120/66 -- -- 98.8 °F (37.1 °C) Axillary 80 20

## 2024-09-12 NOTE — H&P
Hospitalist Admission Note    NAME: Naima Lu   :  1922   MRN:  [id number]     Date/Time:  2024 12:45 PM    Patient PCP: Mirta Sheikh APRN - [place]  ______________________________________________________________________  Given the patient's current clinical presentation, I have a high level of concern for decompensation if discharged from the emergency department.  Complex decision making was performed, which includes reviewing the patient's available past medical records, laboratory results, and x-ray films.       My assessment of this patient's clinical condition and my plan of care is as follows.    Assessment / Plan:  UTI  - cont ceftriaxone, fu cult, ivf as below    Hypernatremia due to dehydration  - start 1/2ns at 100cc/hr, recheck in am    Hypothyroidism  - cont synthroid    Gout  - resume allopurinol on dc    Bilateral conjunctivitis  - start ophthalmic cipro ointment    DNR        Subjective:   CHIEF COMPLAINT: abnormal labs    HISTORY OF PRESENT ILLNESS:     Naima Lu is a 89 y.o.   female who presents with weakness.  Pt with hx dementia, htn, hypothyroidism, is sent from LTC with reports of weakness and hypernatremia on lab work. Pt is found to have a uti in ED, and we are asked to admit. Pt is able to tell me she does not feel well in general, but has no specific complaints.  Her malaise has gone on for a few days per pt. Denies fever, chills cp, n/v, diarrhea.  I do note redness and discharge from both eyes, she does admit to [facility] when asked.     We were asked to admit for work up and evaluation of the above problems.     Past Medical History:   Diagnosis Date    Bradycardia     Dementia (HCC)     Femur fracture, left (HCC) 2021    Gout     Hypertension     Hypoglycemia     OA (osteoarthritis)         History reviewed. No pertinent surgical history.    Social History     Tobacco Use    Smoking status: Never    Smokeless tobacco: Never

## 2024-09-12 NOTE — CARE COORDINATION
09/12/24 1000   Service Assessment   Patient Orientation Self   Cognition Dementia / Early Alzheimer's   History Provided By Medical Record   Primary Caregiver Other (Comment)  (LT nursing staff)   Accompanied By/Relationship Alone in room   Support Systems Family Members   Patient's Healthcare Decision Maker is: Legal Next of Kin   PCP Verified by CM Yes   Last Visit to PCP Within last 3 months   Prior Functional Level Assistance with the following:;Bathing;Dressing;Toileting;Mobility   Current Functional Level Assistance with the following:;Bathing;Dressing;Toileting;Mobility   Can patient return to prior living arrangement Yes   Ability to make needs known: Fair   Family able to assist with home care needs: No   Would you like for me to discuss the discharge plan with any other family members/significant others, and if so, who? No   Financial Resources Medicare;Medicaid   Community Resources None   CM/SW Referral Disease Management Education     Pt is a long term care resident at [facility], unable to interview pt due to confusion.  CM following for DC needs.  Plan to return to EP at [place].

## 2024-09-12 NOTE — PROGRESS NOTES
19:30- IVF infusing. Visitors at bedside. POC discussed. Questions answered.     20:00- Pt provided PO fluids. Pt turned and repositioned Q2H this shift. IV wrapped to prevent accidental d/c.     23:30- Pt remains awake, fidgeting at this time. IVF infusing.     03:00- Pt remains awake, fidgeting at this time. IVF infusing.     04:30- Pt remains awake, fidgeting at this time. IVF infusing.     06:00- Pt resting in bed with eyes closed, respirations even and unlabored. Janet care provided to pt d/t BM.

## 2024-09-12 NOTE — PROGRESS NOTES
0745: Patient admitted to the unit, VS, assessment completed, skin assessment completed, bathed, resting comfortably.     1044 Patient found chewing on gauze. Removed from patients mouth and ensured there were no more gauze dressings for her to pick off and put in her mouth.

## 2024-09-13 LAB
ANION GAP SERPL CALC-SCNC: 3 MMOL/L (ref 3–18)
BASOPHILS # BLD: 0.1 K/UL (ref 0–0.1)
BASOPHILS NFR BLD: 1 % (ref 0–2)
BUN SERPL-MCNC: 22 MG/DL (ref 7–18)
BUN/CREAT SERPL: 28 (ref 12–20)
CA-I BLD-MCNC: 10.3 MG/DL (ref 8.5–10.1)
CHLORIDE SERPL-SCNC: 128 MMOL/L (ref 100–111)
CO2 SERPL-SCNC: 25 MMOL/L (ref 21–32)
CREAT SERPL-MCNC: 0.8 MG/DL (ref 0.6–1.3)
DIFFERENTIAL METHOD BLD: ABNORMAL
EOSINOPHIL # BLD: 0 K/UL (ref 0–0.4)
EOSINOPHIL NFR BLD: 0 % (ref 0–5)
ERYTHROCYTE [DISTWIDTH] IN BLOOD BY AUTOMATED COUNT: 18.5 % (ref 11.6–14.5)
GLUCOSE BLD STRIP.AUTO-MCNC: 83 MG/DL (ref 70–110)
GLUCOSE SERPL-MCNC: 71 MG/DL (ref 74–99)
HCT VFR BLD AUTO: 40.2 % (ref 35–45)
HGB BLD-MCNC: 12.8 G/DL (ref 12–16)
IMM GRANULOCYTES # BLD AUTO: 0 K/UL (ref 0–0.04)
IMM GRANULOCYTES NFR BLD AUTO: 0 % (ref 0–0.5)
LYMPHOCYTES # BLD: 2.9 K/UL (ref 0.9–3.6)
LYMPHOCYTES NFR BLD: 46 % (ref 21–52)
MAGNESIUM SERPL-MCNC: 2.3 MG/DL (ref 1.6–2.6)
MCH RBC QN AUTO: 31.2 PG (ref 24–34)
MCHC RBC AUTO-ENTMCNC: 31.8 G/DL (ref 31–37)
MCV RBC AUTO: 98 FL (ref 78–100)
MONOCYTES # BLD: 0.7 K/UL (ref 0.05–1.2)
MONOCYTES NFR BLD: 10 % (ref 3–10)
NEUTS SEG # BLD: 2.8 K/UL (ref 1.8–8)
NEUTS SEG NFR BLD: 43 % (ref 40–73)
NRBC # BLD: 0.05 K/UL (ref 0–0.01)
NRBC BLD-RTO: 0.8 PER 100 WBC
PERFORMED BY:: NORMAL
PHOSPHATE SERPL-MCNC: 2 MG/DL (ref 2.5–4.9)
PLATELET # BLD AUTO: 90 K/UL (ref 135–420)
PLATELET COMMENT: ABNORMAL
PMV BLD AUTO: 13.6 FL (ref 9.2–11.8)
POTASSIUM SERPL-SCNC: 3.5 MMOL/L (ref 3.5–5.5)
RBC # BLD AUTO: 4.1 M/UL (ref 4.2–5.3)
RBC MORPH BLD: ABNORMAL
RBC MORPH BLD: ABNORMAL
SODIUM SERPL-SCNC: 156 MMOL/L (ref 136–145)
WBC # BLD AUTO: 6.5 K/UL (ref 4.6–13.2)

## 2024-09-13 PROCEDURE — 6360000002 HC RX W HCPCS: Performed by: INTERNAL MEDICINE

## 2024-09-13 PROCEDURE — 6370000000 HC RX 637 (ALT 250 FOR IP): Performed by: INTERNAL MEDICINE

## 2024-09-13 PROCEDURE — 83735 ASSAY OF MAGNESIUM: CPT

## 2024-09-13 PROCEDURE — 82962 GLUCOSE BLOOD TEST: CPT

## 2024-09-13 PROCEDURE — 1100000000 HC RM PRIVATE

## 2024-09-13 PROCEDURE — 36415 COLL VENOUS BLD VENIPUNCTURE: CPT

## 2024-09-13 PROCEDURE — 85025 COMPLETE CBC W/AUTO DIFF WBC: CPT

## 2024-09-13 PROCEDURE — 94761 N-INVAS EAR/PLS OXIMETRY MLT: CPT

## 2024-09-13 PROCEDURE — 80048 BASIC METABOLIC PNL TOTAL CA: CPT

## 2024-09-13 PROCEDURE — 84100 ASSAY OF PHOSPHORUS: CPT

## 2024-09-13 PROCEDURE — 2580000003 HC RX 258: Performed by: INTERNAL MEDICINE

## 2024-09-13 RX ORDER — LEVOFLOXACIN 5 MG/ML
500 INJECTION, SOLUTION INTRAVENOUS EVERY 24 HOURS
Status: DISCONTINUED | OUTPATIENT
Start: 2024-09-13 | End: 2024-09-14 | Stop reason: HOSPADM

## 2024-09-13 RX ADMIN — LEVOTHYROXINE SODIUM 25 MCG: 0.05 TABLET ORAL at 06:12

## 2024-09-13 RX ADMIN — SODIUM CHLORIDE: 4.5 INJECTION, SOLUTION INTRAVENOUS at 09:02

## 2024-09-13 RX ADMIN — CIPROFLOXACIN 1 DROP: 3 SOLUTION OPHTHALMIC at 17:11

## 2024-09-13 RX ADMIN — WATER 1000 MG: 1 INJECTION INTRAMUSCULAR; INTRAVENOUS; SUBCUTANEOUS at 06:02

## 2024-09-13 RX ADMIN — ENOXAPARIN SODIUM 30 MG: 100 INJECTION SUBCUTANEOUS at 08:50

## 2024-09-13 RX ADMIN — SODIUM CHLORIDE, PRESERVATIVE FREE 10 ML: 5 INJECTION INTRAVENOUS at 19:51

## 2024-09-13 RX ADMIN — CIPROFLOXACIN 1 DROP: 3 SOLUTION OPHTHALMIC at 12:52

## 2024-09-13 RX ADMIN — SODIUM CHLORIDE, PRESERVATIVE FREE 10 ML: 5 INJECTION INTRAVENOUS at 09:17

## 2024-09-13 RX ADMIN — ASPIRIN 81 MG 81 MG: 81 TABLET ORAL at 08:50

## 2024-09-13 RX ADMIN — SODIUM CHLORIDE: 4.5 INJECTION, SOLUTION INTRAVENOUS at 17:12

## 2024-09-13 RX ADMIN — SODIUM CHLORIDE: 4.5 INJECTION, SOLUTION INTRAVENOUS at 01:11

## 2024-09-13 RX ADMIN — CIPROFLOXACIN 1 DROP: 3 SOLUTION OPHTHALMIC at 13:59

## 2024-09-13 RX ADMIN — MEMANTINE 10 MG: 5 TABLET ORAL at 19:50

## 2024-09-13 RX ADMIN — CIPROFLOXACIN 1 DROP: 3 SOLUTION OPHTHALMIC at 06:14

## 2024-09-13 RX ADMIN — CIPROFLOXACIN 1 DROP: 3 SOLUTION OPHTHALMIC at 21:52

## 2024-09-13 RX ADMIN — MEMANTINE 10 MG: 5 TABLET ORAL at 08:51

## 2024-09-13 RX ADMIN — LEVOFLOXACIN 500 MG: 500 INJECTION, SOLUTION INTRAVENOUS at 08:54

## 2024-09-13 RX ADMIN — CIPROFLOXACIN 1 DROP: 3 SOLUTION OPHTHALMIC at 19:50

## 2024-09-13 RX ADMIN — CIPROFLOXACIN 1 DROP: 3 SOLUTION OPHTHALMIC at 15:21

## 2024-09-13 RX ADMIN — CIPROFLOXACIN 1 DROP: 3 SOLUTION OPHTHALMIC at 09:38

## 2024-09-13 ASSESSMENT — PAIN SCALES - PAIN ASSESSMENT IN ADVANCED DEMENTIA (PAINAD)
FACIALEXPRESSION: SMILING OR INEXPRESSIVE
TOTALSCORE: 0
BREATHING: NORMAL
CONSOLABILITY: NO NEED TO CONSOLE
TOTALSCORE: 0
FACIALEXPRESSION: SMILING OR INEXPRESSIVE
BREATHING: NORMAL
BODYLANGUAGE: RELAXED
BREATHING: NORMAL
BODYLANGUAGE: RELAXED
FACIALEXPRESSION: SMILING OR INEXPRESSIVE
CONSOLABILITY: NO NEED TO CONSOLE
CONSOLABILITY: NO NEED TO CONSOLE
BODYLANGUAGE: RELAXED
TOTALSCORE: 0

## 2024-09-13 NOTE — PROGRESS NOTES
1300- assumed care and received bedside shift report, alert, but disoriented x 3, bed alarm on and video camera for safety, call bell in reach.  [facility] client - close at nursing station.     1751- repositioned, brief clean and dry, no distress, assisted with ADL's throughout shift and reoriented, bed alarm on, call bell in reach, video in place.    1855- Report given to RN.

## 2024-09-13 NOTE — PROGRESS NOTES
Hospitalist Progress Note             Date of Service:  2024  NAME:  Naima Lu  :  1922  MRN:  [id number]    Hpi- pt feeling much better today, no cp, no fevers,  Assessment / Plan:  UTI  -change abx to iv levofloxacin based on prior cultures, fu cult, ivf as below     Hypernatremia due to dehydration  - start 1/2ns at 100cc/hr, recheck in am, improving Na levels     Hypothyroidism  - cont synthroid     Gout  - resume allopurinol on dc     Bilateral conjunctivitis  - start ophthalmic cipro ointment     DNR       1 more day iv abx and ivf, dc in am  Review of Systems:   Pertinent items are noted in HPI.       Vital Signs:    Last 24hrs VS reviewed since prior progress note. Most recent are:  Vitals:    24 1000   BP:    Pulse: 78   Resp:    Temp:    SpO2:          Intake/Output Summary (Last 24 hours) at 2024 1047  Last data filed at 2024 0906  Gross per 24 hour   Intake 1753.77 ml   Output 550 ml   Net 1203.77 ml        Physical Examination:             General:          Alert, cooperative, no distress, appears stated age.   mild periorbital erythema  HEENT:           Atraumatic, anicteric sclerae, pink conjunctivae                          No oral ulcers, mucosa moist, throat clear, dentition fair  Neck:               Supple, symmetrical  Lungs:             Clear to auscultation bilaterally.  No Wheezing or Rhonchi. No rales.  Chest wall:      No tenderness  No Accessory muscle use.  Heart:              Regular  rhythm,  No  murmur   No edema  Abdomen:        Soft, non-tender. Not distended.  Bowel sounds normal  Extremities:     No cyanosis.  No clubbing,                            Skin turgor normal, Capillary refill normal  Skin:                Not pale.  Not Jaundiced  No rashes   Psych:             Not anxious or agitated.  Neurologic:      Alert, moves all extremities, answers  tablet 4 mg  4 mg Oral Q8H PRN    Or    ondansetron (ZOFRAN) injection 4 mg  4 mg IntraVENous Q6H PRN    polyethylene glycol (GLYCOLAX) packet 17 g  17 g Oral Daily PRN    acetaminophen (TYLENOL) tablet 650 mg  650 mg Oral Q6H PRN    Or    acetaminophen (TYLENOL) suppository 650 mg  650 mg Rectal Q6H PRN    memantine (NAMENDA) tablet 10 mg  10 mg Oral BID     ______________________________________________________________________  EXPECTED LENGTH OF STAY: 2  ACTUAL LENGTH OF STAY:          1                 Ryan Fernandez MD

## 2024-09-13 NOTE — PLAN OF CARE
Problem: Safety - Adult  Goal: Free from fall injury  9/13/2024 1303 by Isiah Long RN  Outcome: Progressing  9/13/2024 1252 by Magnolia Kaye RN  Outcome: Progressing  9/13/2024 0320 by Sheela Kapadia RN  Outcome: Progressing     Problem: Discharge Planning  Goal: Discharge to home or other facility with appropriate resources  9/13/2024 1303 by Isiah Long RN  Outcome: Progressing  9/13/2024 1252 by Magnolia Kaye RN  Outcome: Progressing  9/13/2024 0320 by Sheela Kapadia RN  Outcome: Progressing     Problem: ABCDS Injury Assessment  Goal: Absence of physical injury  9/13/2024 1303 by Isiah Long RN  Outcome: Progressing  9/13/2024 1252 by Magnolia Kaye RN  Outcome: Progressing  9/13/2024 0320 by Sheela Kapadia RN  Outcome: Progressing     Problem: Skin/Tissue Integrity  Goal: Absence of new skin breakdown  Description: 1.  Monitor for areas of redness and/or skin breakdown  2.  Assess vascular access sites hourly  3.  Every 4-6 hours minimum:  Change oxygen saturation probe site  4.  Every 4-6 hours:  If on nasal continuous positive airway pressure, respiratory therapy assess nares and determine need for appliance change or resting period.  9/13/2024 1252 by Magnolia Kaye RN  Outcome: Progressing  9/13/2024 0320 by Sheela Kapadia RN  Outcome: Progressing

## 2024-09-13 NOTE — PLAN OF CARE
Problem: Safety - Adult  Goal: Free from fall injury  9/13/2024 1252 by Magnolia Kaye RN  Outcome: Progressing  9/13/2024 0320 by Sheela Kapadia RN  Outcome: Progressing     Problem: Discharge Planning  Goal: Discharge to home or other facility with appropriate resources  9/13/2024 1252 by Magnolia Kaye RN  Outcome: Progressing  9/13/2024 0320 by Sheela Kapadia RN  Outcome: Progressing     Problem: ABCDS Injury Assessment  Goal: Absence of physical injury  9/13/2024 1252 by Magnolia Kaye RN  Outcome: Progressing  9/13/2024 0320 by Sheela Kapadia RN  Outcome: Progressing     Problem: Skin/Tissue Integrity  Goal: Absence of new skin breakdown  Description: 1.  Monitor for areas of redness and/or skin breakdown  2.  Assess vascular access sites hourly  3.  Every 4-6 hours minimum:  Change oxygen saturation probe site  4.  Every 4-6 hours:  If on nasal continuous positive airway pressure, respiratory therapy assess nares and determine need for appliance change or resting period.  9/13/2024 1252 by Magnolia Kaye RN  Outcome: Progressing  9/13/2024 0320 by Sheela Kapadia RN  Outcome: Progressing

## 2024-09-13 NOTE — PLAN OF CARE
Problem: Safety - Adult  Goal: Free from fall injury  9/13/2024 1938 by Camila Huitron RN  Outcome: Progressing  9/13/2024 1303 by Isiah Long RN  Outcome: Progressing  9/13/2024 1252 by Magnolia Kaye RN  Outcome: Progressing     Problem: Discharge Planning  Goal: Discharge to home or other facility with appropriate resources  9/13/2024 1938 by Camila Huitron RN  Outcome: Progressing  9/13/2024 1303 by Isiah Long RN  Outcome: Progressing  9/13/2024 1252 by Magnolia Kaye RN  Outcome: Progressing     Problem: ABCDS Injury Assessment  Goal: Absence of physical injury  9/13/2024 1938 by Camila Huitron RN  Outcome: Progressing  9/13/2024 1303 by Isiah Long RN  Outcome: Progressing  9/13/2024 1252 by Magnolia Kaye RN  Outcome: Progressing     Problem: Skin/Tissue Integrity  Goal: Absence of new skin breakdown  Description: 1.  Monitor for areas of redness and/or skin breakdown  2.  Assess vascular access sites hourly  3.  Every 4-6 hours minimum:  Change oxygen saturation probe site  4.  Every 4-6 hours:  If on nasal continuous positive airway pressure, respiratory therapy assess nares and determine need for appliance change or resting period.  9/13/2024 1938 by Camila Huitron RN  Outcome: Progressing  9/13/2024 1252 by Magnolia Kaye RN  Outcome: Progressing     Problem: Neurosensory - Adult  Goal: Achieves stable or improved neurological status  Outcome: Progressing     Problem: Genitourinary - Adult  Goal: Absence of urinary retention  Outcome: Progressing     Problem: Pain  Goal: Verbalizes/displays adequate comfort level or baseline comfort level  Outcome: Progressing

## 2024-09-13 NOTE — PROGRESS NOTES
Report received from Magnolia KIMBLE and care assumed.  Pt found lying in bed, assessment and VS completed, no c/o pain or distress.    2300 Pt awake in bed, quiet, no c/o pain or distress. Pt given water and pudding.    0300 Pt resting quietly, no c/o pain or distress.     0530 Diaper changed, vikas care, pure wick changed with linens, very small BM noted.    0600 BG noted to be 66 from BMP this am, pt given 120ml OJ and will repeat BG with a finger stick.    0645 Repeat BG=86.    0700 Report given to Nikos kimble and care assumed.

## 2024-09-13 NOTE — PROGRESS NOTES
Spiritual Health Assessment/Progress Note  [facility]    (P) Attempted Encounter,  ,  ,      Name: Naima Lu MRN: [id number]    Age: 89 y.o.     Sex: female   Language: English   Orthodoxy: Other   Hypernatremia     Date: 9/13/2024            Total Time Calculated: (P) 3 min              Spiritual Assessment began in [address]        Referral/Consult From: (P) Rounding   Encounter Overview/Reason: (P) Attempted Encounter  Service Provided For: (P) Patient    Kelli, Belief, Meaning:   Patient Other: Patient unable to assess.  Family/Friends No family/friends present      Importance and Influence:  Patient unable to communicate at this time  Family/Friends no family/friends present    Community:  Patient Other: No family present.  Family/Friends Other: No family Present.    Assessment and Plan of Care:     Patient Interventions include: Other: Patient unable to assess.  Family/Friends Interventions include: Other: Unable to assess.    Patient Plan of Care: Spiritual Care available upon further referral  Family/Friends Plan of Care: Other: No family Present.    Electronically signed by Chaplain Sohail on 9/13/2024 at 2:52 PM

## 2024-09-14 VITALS
WEIGHT: 174 LBS | TEMPERATURE: 97.9 F | RESPIRATION RATE: 16 BRPM | DIASTOLIC BLOOD PRESSURE: 97 MMHG | HEART RATE: 73 BPM | OXYGEN SATURATION: 98 % | SYSTOLIC BLOOD PRESSURE: 131 MMHG | HEIGHT: 66 IN | BODY MASS INDEX: 27.97 KG/M2

## 2024-09-14 LAB
ANION GAP SERPL CALC-SCNC: 4 MMOL/L (ref 3–18)
BASOPHILS # BLD: 0 K/UL (ref 0–0.1)
BASOPHILS NFR BLD: 0 % (ref 0–2)
BUN SERPL-MCNC: 16 MG/DL (ref 7–18)
BUN/CREAT SERPL: 22 (ref 12–20)
CA-I BLD-MCNC: 10.2 MG/DL (ref 8.5–10.1)
CHLORIDE SERPL-SCNC: 123 MMOL/L (ref 100–111)
CO2 SERPL-SCNC: 21 MMOL/L (ref 21–32)
CREAT SERPL-MCNC: 0.74 MG/DL (ref 0.6–1.3)
DIFFERENTIAL METHOD BLD: ABNORMAL
EOSINOPHIL # BLD: 0 K/UL (ref 0–0.4)
EOSINOPHIL NFR BLD: 0 % (ref 0–5)
ERYTHROCYTE [DISTWIDTH] IN BLOOD BY AUTOMATED COUNT: 18 % (ref 11.6–14.5)
GLUCOSE BLD STRIP.AUTO-MCNC: 86 MG/DL (ref 70–110)
GLUCOSE SERPL-MCNC: 66 MG/DL (ref 74–99)
HCT VFR BLD AUTO: 41.2 % (ref 35–45)
HGB BLD-MCNC: 13.3 G/DL (ref 12–16)
IMM GRANULOCYTES # BLD AUTO: 0 K/UL (ref 0–0.04)
IMM GRANULOCYTES NFR BLD AUTO: 0 % (ref 0–0.5)
LYMPHOCYTES # BLD: 2.5 K/UL (ref 0.9–3.6)
LYMPHOCYTES NFR BLD: 41 % (ref 21–52)
MAGNESIUM SERPL-MCNC: 2.2 MG/DL (ref 1.6–2.6)
MCH RBC QN AUTO: 31.6 PG (ref 24–34)
MCHC RBC AUTO-ENTMCNC: 32.3 G/DL (ref 31–37)
MCV RBC AUTO: 97.9 FL (ref 78–100)
MONOCYTES # BLD: 0.6 K/UL (ref 0.05–1.2)
MONOCYTES NFR BLD: 9 % (ref 3–10)
NEUTS SEG # BLD: 3 K/UL (ref 1.8–8)
NEUTS SEG NFR BLD: 50 % (ref 40–73)
NRBC # BLD: 0.02 K/UL (ref 0–0.01)
NRBC BLD-RTO: 0.3 PER 100 WBC
PERFORMED BY:: NORMAL
PHOSPHATE SERPL-MCNC: 1.9 MG/DL (ref 2.5–4.9)
PLATELET # BLD AUTO: 103 K/UL (ref 135–420)
PMV BLD AUTO: 12.8 FL (ref 9.2–11.8)
POTASSIUM SERPL-SCNC: 3.6 MMOL/L (ref 3.5–5.5)
RBC # BLD AUTO: 4.21 M/UL (ref 4.2–5.3)
SODIUM SERPL-SCNC: 148 MMOL/L (ref 136–145)
WBC # BLD AUTO: 6.1 K/UL (ref 4.6–13.2)

## 2024-09-14 PROCEDURE — 2580000003 HC RX 258: Performed by: INTERNAL MEDICINE

## 2024-09-14 PROCEDURE — 36415 COLL VENOUS BLD VENIPUNCTURE: CPT

## 2024-09-14 PROCEDURE — 80048 BASIC METABOLIC PNL TOTAL CA: CPT

## 2024-09-14 PROCEDURE — 6360000002 HC RX W HCPCS: Performed by: INTERNAL MEDICINE

## 2024-09-14 PROCEDURE — 94761 N-INVAS EAR/PLS OXIMETRY MLT: CPT

## 2024-09-14 PROCEDURE — 83735 ASSAY OF MAGNESIUM: CPT

## 2024-09-14 PROCEDURE — 84100 ASSAY OF PHOSPHORUS: CPT

## 2024-09-14 PROCEDURE — 85025 COMPLETE CBC W/AUTO DIFF WBC: CPT

## 2024-09-14 PROCEDURE — 82962 GLUCOSE BLOOD TEST: CPT

## 2024-09-14 PROCEDURE — 6370000000 HC RX 637 (ALT 250 FOR IP): Performed by: INTERNAL MEDICINE

## 2024-09-14 RX ORDER — LEVOFLOXACIN 250 MG/1
250 TABLET, FILM COATED ORAL DAILY
Qty: 5 TABLET | Refills: 0
Start: 2024-09-14 | End: 2024-09-19

## 2024-09-14 RX ADMIN — MEMANTINE 10 MG: 5 TABLET ORAL at 07:51

## 2024-09-14 RX ADMIN — LEVOTHYROXINE SODIUM 25 MCG: 0.05 TABLET ORAL at 05:32

## 2024-09-14 RX ADMIN — ENOXAPARIN SODIUM 30 MG: 100 INJECTION SUBCUTANEOUS at 07:51

## 2024-09-14 RX ADMIN — CIPROFLOXACIN 1 DROP: 3 SOLUTION OPHTHALMIC at 05:32

## 2024-09-14 RX ADMIN — CIPROFLOXACIN 1 DROP: 3 SOLUTION OPHTHALMIC at 10:07

## 2024-09-14 RX ADMIN — SODIUM CHLORIDE, PRESERVATIVE FREE 5 ML: 5 INJECTION INTRAVENOUS at 07:51

## 2024-09-14 RX ADMIN — CIPROFLOXACIN 1 DROP: 3 SOLUTION OPHTHALMIC at 07:51

## 2024-09-14 RX ADMIN — LEVOFLOXACIN 500 MG: 500 INJECTION, SOLUTION INTRAVENOUS at 06:47

## 2024-09-14 RX ADMIN — ASPIRIN 81 MG 81 MG: 81 TABLET ORAL at 07:51

## 2024-09-14 ASSESSMENT — PAIN SCALES - WONG BAKER: WONGBAKER_NUMERICALRESPONSE: NO HURT

## 2024-09-14 NOTE — DISCHARGE SUMMARY
Discharge Summary       PATIENT ID: Naima Lu  MRN: [id number]   YOB: 1922    DATE OF ADMISSION: 9/12/2024  4:41 AM    DATE OF DISCHARGE: 09/14/24    PRIMARY CARE PROVIDER: Mirta Sheikh APRN - CNP     ATTENDING PHYSICIAN: Ryan Fernandez MD  DISCHARGING PROVIDER: Ryan Fernandez MD        CONSULTATIONS: None    PROCEDURES/SURGERIES: * No surgery found *    ADMITTING DIAGNOSES & HOSPITAL COURSE:   Naima Lu is a 89 y.o.   female who presents with weakness.  Pt with hx dementia, htn, hypothyroidism, is sent from [facility] with reports of weakness and hypernatremia on lab work. Pt is found to have a uti in ED, and we are asked to admit. Pt is able to tell me she does not feel well in general, but has no specific complaints.  Her malaise has gone on for a few days per pt. Denies fever, chills cp, n/v, diarrhea.  I do note redness and discharge from both eyes, she does admit to [facility] when asked.      We were asked to admit for work up and evaluation of the above problems.            DISCHARGE DIAGNOSES / PLAN:      Hpi- pt feeling much better today, no cp, no fevers,  Assessment / Plan:  UTI  -change abx to iv levofloxacin based on prior cultures, fu cult, ivf as below     Hypernatremia due to dehydration  - start 1/2ns at 100cc/hr, recheck in am, improving Na levels     Hypothyroidism  - cont synthroid     Gout  - resume allopurinol on dc     Bilateral conjunctivitis  - start ophthalmic cipro ointment     DNR     Day of dc, sodium improving nicely, of course pt will need to cont po intake.  Cont po levofloxacin.  Ok for dc.    DISCHARGE MEDICATIONS:  Current Discharge Medication List        START taking these medications    Details   levoFLOXacin (LEVAQUIN) 250 MG tablet Take 1 tablet by mouth daily for 5 days  Qty: 5 tablet, Refills: 0           CONTINUE these medications which have NOT CHANGED    Details   memantine (NAMENDA) 10 MG tablet Take 1 tablet by mouth 2  times daily      acetaminophen (TYLENOL) 500 MG tablet Take 2 tablets by mouth daily      allopurinol (ZYLOPRIM) 100 MG tablet Take 1 tablet by mouth daily      aspirin 81 MG chewable tablet ceived the following from [facility]: Outside name: aspirin 81 mg chewable tablet      levothyroxine (SYNTHROID) 25 MCG tablet Take 1 tablet by mouth every morning (before breakfast)      loratadine (CLARITIN) 10 MG tablet Take 1 tablet by mouth      melatonin 10 MG CAPS capsule Take 1 capsule by mouth nightly      potassium chloride (KLOR-CON M) 20 MEQ extended release tablet Take 1 tablet by mouth daily      zinc gluconate 50 MG tablet Take 1 tablet by mouth daily ceived the following from [facility]: Outside name: zinc gluconate 100 mg tab      loperamide (IMODIUM) 2 MG capsule Take 1 capsule by mouth as needed for Diarrhea Max 16mg/day      acetaminophen (TYLENOL) 650 MG extended release tablet Take 1 tablet by mouth every 12 hours as needed for Pain (Pain scale 1-3) ceived the following from [facility]: Outside name: Arthritis Pain Relief 650 mg TbER           STOP taking these medications       ascorbic acid (VITAMIN C) 500 MG tablet Comments:   Reason for Stopping:         vitamin D (CHOLECALCIFEROL) 25 MCG (1000 UT) TABS tablet Comments:   Reason for Stopping:         guaiFENesin (MUCINEX) 600 MG extended release tablet Comments:   Reason for Stopping:         hydrOXYzine pamoate (VISTARIL) 25 MG capsule Comments:   Reason for Stopping:         meloxicam (MOBIC) 7.5 MG tablet Comments:   Reason for Stopping:         traZODone (DESYREL) 50 MG tablet Comments:   Reason for Stopping:                 NOTIFY YOUR PHYSICIAN FOR ANY OF THE FOLLOWING:   Fever over 101 degrees for 24 hours.   Chest pain, shortness of breath, fever, chills, nausea, vomiting, diarrhea, change in mentation, falling, weakness, bleeding. Severe pain or pain not relieved by medications.  Or, any other

## 2024-09-14 NOTE — PROGRESS NOTES
Report called to [facility] at 1000. Patient cleaned, IVs removed and transferred to  1045

## 2024-09-14 NOTE — PLAN OF CARE
Problem: Safety - Adult  Goal: Free from fall injury  9/14/2024 0906 by Nikos Neville RN  Outcome: Not Progressing  9/13/2024 1938 by Camila Huitron RN  Outcome: Progressing     Problem: Discharge Planning  Goal: Discharge to home or other facility with appropriate resources  9/14/2024 0906 by Nikos Neville RN  Outcome: Not Progressing  Flowsheets (Taken 9/14/2024 0802)  Discharge to home or other facility with appropriate resources:   Identify barriers to discharge with patient and caregiver   Arrange for needed discharge resources and transportation as appropriate   Identify discharge learning needs (meds, wound care, etc)   Arrange for interpreters to assist at discharge as needed  9/13/2024 1938 by Camila Huitron RN  Outcome: Progressing     Problem: ABCDS Injury Assessment  Goal: Absence of physical injury  9/14/2024 0906 by Nikos Neville RN  Outcome: Not Progressing  9/13/2024 1938 by Camila Huitron RN  Outcome: Progressing     Problem: Skin/Tissue Integrity  Goal: Absence of new skin breakdown  Description: 1.  Monitor for areas of redness and/or skin breakdown  2.  Assess vascular access sites hourly  3.  Every 4-6 hours minimum:  Change oxygen saturation probe site  4.  Every 4-6 hours:  If on nasal continuous positive airway pressure, respiratory therapy assess nares and determine need for appliance change or resting period.  9/14/2024 0906 by Nikos Neville RN  Outcome: Not Progressing  9/13/2024 1938 by Camila Huitron RN  Outcome: Progressing     Problem: Neurosensory - Adult  Goal: Achieves stable or improved neurological status  9/14/2024 0906 by Nikos Neville RN  Outcome: Not Progressing  9/13/2024 1938 by Camila Huitron RN  Outcome: Progressing     Problem: Genitourinary - Adult  Goal: Absence of urinary retention  9/14/2024 0906 by Nikos Neville RN  Outcome: Not Progressing  9/13/2024 1938 by Camila Huitron RN  Outcome: Progressing     Problem: Pain  Goal:  Verbalizes/displays adequate comfort level or baseline comfort level  9/14/2024 0906 by Nikos Neville RN  Outcome: Not Progressing  9/13/2024 1938 by Camila Huitron RN  Outcome: Progressing     Problem: Safety - Adult  Goal: Free from fall injury  9/14/2024 0906 by Nikos Neville RN  Outcome: Not Progressing  9/13/2024 1938 by Camila Huitron RN  Outcome: Progressing     Problem: Discharge Planning  Goal: Discharge to home or other facility with appropriate resources  9/14/2024 0906 by Nikos Neville RN  Outcome: Not Progressing  Flowsheets (Taken 9/14/2024 0802)  Discharge to home or other facility with appropriate resources:   Identify barriers to discharge with patient and caregiver   Arrange for needed discharge resources and transportation as appropriate   Identify discharge learning needs (meds, wound care, etc)   Arrange for interpreters to assist at discharge as needed  9/13/2024 1938 by Camila Huitron RN  Outcome: Progressing     Problem: ABCDS Injury Assessment  Goal: Absence of physical injury  9/14/2024 0906 by Nikos Neville RN  Outcome: Not Progressing  9/13/2024 1938 by Camila Huitron RN  Outcome: Progressing     Problem: Skin/Tissue Integrity  Goal: Absence of new skin breakdown  Description: 1.  Monitor for areas of redness and/or skin breakdown  2.  Assess vascular access sites hourly  3.  Every 4-6 hours minimum:  Change oxygen saturation probe site  4.  Every 4-6 hours:  If on nasal continuous positive airway pressure, respiratory therapy assess nares and determine need for appliance change or resting period.  9/14/2024 0906 by Nikos Neville RN  Outcome: Not Progressing  9/13/2024 1938 by Camila Huitron RN  Outcome: Progressing     Problem: Neurosensory - Adult  Goal: Achieves stable or improved neurological status  9/14/2024 0906 by Nikos Neville RN  Outcome: Not Progressing  9/13/2024 1938 by Camila Huitron RN  Outcome: Progressing     Problem: Genitourinary -

## 2024-09-14 NOTE — PROGRESS NOTES
Spoke with Yanelis DELGADO RN at [facility] to clarify whether patient needed a COVID test prior to returning to [facility].    Yanelis stated patient did not need a COVID as she recently had COVID.      Primary nurse Nikos soler.

## 2025-05-03 NOTE — ED NOTES
Attempted to straight cath pt, however pt voided just prior to attempt   pt cleansed and pure wick put in place  
ED Record Reviewed